# Patient Record
Sex: FEMALE | Race: WHITE | Employment: OTHER | URBAN - METROPOLITAN AREA
[De-identification: names, ages, dates, MRNs, and addresses within clinical notes are randomized per-mention and may not be internally consistent; named-entity substitution may affect disease eponyms.]

---

## 2017-03-06 ENCOUNTER — TELEPHONE (OUTPATIENT)
Dept: PAIN MANAGEMENT | Age: 52
End: 2017-03-06

## 2017-04-14 RX ORDER — GABAPENTIN 300 MG/1
CAPSULE ORAL
Qty: 270 CAPSULE | Refills: 0 | OUTPATIENT
Start: 2017-04-14

## 2017-07-02 DIAGNOSIS — G43.709 CHRONIC MIGRAINE W/O AURA W/O STATUS MIGRAINOSUS, NOT INTRACTABLE: ICD-10-CM

## 2017-07-03 RX ORDER — SUMATRIPTAN 100 MG/1
TABLET, FILM COATED ORAL
Qty: 9 TABLET | Refills: 4 | OUTPATIENT
Start: 2017-07-03

## 2017-07-06 ENCOUNTER — OFFICE VISIT (OUTPATIENT)
Dept: FAMILY MEDICINE CLINIC | Age: 52
End: 2017-07-06

## 2017-07-06 ENCOUNTER — TELEPHONE (OUTPATIENT)
Dept: FAMILY MEDICINE CLINIC | Age: 52
End: 2017-07-06

## 2017-07-06 VITALS
BODY MASS INDEX: 24.8 KG/M2 | HEIGHT: 63 IN | SYSTOLIC BLOOD PRESSURE: 144 MMHG | DIASTOLIC BLOOD PRESSURE: 82 MMHG | WEIGHT: 140 LBS

## 2017-07-06 DIAGNOSIS — Z11.59 NEED FOR HEPATITIS C SCREENING TEST: ICD-10-CM

## 2017-07-06 DIAGNOSIS — I48.3 TYPICAL ATRIAL FLUTTER (HCC): ICD-10-CM

## 2017-07-06 DIAGNOSIS — E78.00 HYPERCHOLESTEROLEMIA: ICD-10-CM

## 2017-07-06 DIAGNOSIS — K27.9 PUD (PEPTIC ULCER DISEASE): ICD-10-CM

## 2017-07-06 DIAGNOSIS — Z00.00 PREVENTATIVE HEALTH CARE: ICD-10-CM

## 2017-07-06 DIAGNOSIS — I77.1 SUBCLAVIAN ARTERY STENOSIS (HCC): ICD-10-CM

## 2017-07-06 DIAGNOSIS — G43.709 CHRONIC MIGRAINE W/O AURA W/O STATUS MIGRAINOSUS, NOT INTRACTABLE: ICD-10-CM

## 2017-07-06 DIAGNOSIS — I77.1 CELIAC ARTERY STENOSIS (HCC): ICD-10-CM

## 2017-07-06 DIAGNOSIS — I10 ESSENTIAL HYPERTENSION: Primary | ICD-10-CM

## 2017-07-06 DIAGNOSIS — M54.31 RIGHT SIDED SCIATICA: ICD-10-CM

## 2017-07-06 DIAGNOSIS — Z12.39 SCREENING FOR BREAST CANCER: ICD-10-CM

## 2017-07-06 DIAGNOSIS — I25.10 CORONARY ARTERY DISEASE INVOLVING NATIVE CORONARY ARTERY OF NATIVE HEART WITHOUT ANGINA PECTORIS: ICD-10-CM

## 2017-07-06 DIAGNOSIS — G89.29 CHRONIC MIDLINE LOW BACK PAIN WITH RIGHT-SIDED SCIATICA: ICD-10-CM

## 2017-07-06 DIAGNOSIS — I47.1 ATRIAL TACHYCARDIA, MULTIFOCAL (HCC): ICD-10-CM

## 2017-07-06 DIAGNOSIS — Z72.0 TOBACCO ABUSE: ICD-10-CM

## 2017-07-06 DIAGNOSIS — M54.41 CHRONIC MIDLINE LOW BACK PAIN WITH RIGHT-SIDED SCIATICA: ICD-10-CM

## 2017-07-06 PROCEDURE — 99214 OFFICE O/P EST MOD 30 MIN: CPT | Performed by: FAMILY MEDICINE

## 2017-07-06 PROCEDURE — 4004F PT TOBACCO SCREEN RCVD TLK: CPT | Performed by: FAMILY MEDICINE

## 2017-07-06 PROCEDURE — G8427 DOCREV CUR MEDS BY ELIG CLIN: HCPCS | Performed by: FAMILY MEDICINE

## 2017-07-06 PROCEDURE — G8598 ASA/ANTIPLAT THER USED: HCPCS | Performed by: FAMILY MEDICINE

## 2017-07-06 PROCEDURE — 3017F COLORECTAL CA SCREEN DOC REV: CPT | Performed by: FAMILY MEDICINE

## 2017-07-06 PROCEDURE — 3014F SCREEN MAMMO DOC REV: CPT | Performed by: FAMILY MEDICINE

## 2017-07-06 PROCEDURE — G8420 CALC BMI NORM PARAMETERS: HCPCS | Performed by: FAMILY MEDICINE

## 2017-07-06 RX ORDER — SUMATRIPTAN 100 MG/1
TABLET, FILM COATED ORAL
Qty: 9 TABLET | Refills: 5 | Status: SHIPPED | OUTPATIENT
Start: 2017-07-06

## 2017-07-06 RX ORDER — CLOPIDOGREL BISULFATE 75 MG/1
75 TABLET ORAL DAILY
Qty: 30 TABLET | Refills: 5 | Status: SHIPPED | OUTPATIENT
Start: 2017-07-06

## 2017-07-06 RX ORDER — ATORVASTATIN CALCIUM 80 MG/1
80 TABLET, FILM COATED ORAL DAILY
Qty: 30 TABLET | Refills: 5 | Status: SHIPPED | OUTPATIENT
Start: 2017-07-06 | End: 2017-12-25

## 2017-07-06 RX ORDER — GABAPENTIN 300 MG/1
CAPSULE ORAL
Qty: 270 CAPSULE | Refills: 1 | Status: SHIPPED | OUTPATIENT
Start: 2017-07-06 | End: 2017-12-19 | Stop reason: SDUPTHER

## 2017-07-06 RX ORDER — PANTOPRAZOLE SODIUM 40 MG/1
40 TABLET, DELAYED RELEASE ORAL DAILY
Qty: 30 TABLET | Refills: 5 | Status: SHIPPED | OUTPATIENT
Start: 2017-07-06 | End: 2017-11-03 | Stop reason: SDUPTHER

## 2017-07-06 RX ORDER — ATENOLOL 50 MG/1
50 TABLET ORAL DAILY
Qty: 30 TABLET | Refills: 5 | Status: SHIPPED | OUTPATIENT
Start: 2017-07-06 | End: 2018-01-08

## 2017-07-06 ASSESSMENT — ENCOUNTER SYMPTOMS: SHORTNESS OF BREATH: 0

## 2017-07-07 ENCOUNTER — TELEPHONE (OUTPATIENT)
Dept: FAMILY MEDICINE CLINIC | Age: 52
End: 2017-07-07

## 2017-07-10 ENCOUNTER — TELEPHONE (OUTPATIENT)
Dept: FAMILY MEDICINE CLINIC | Age: 52
End: 2017-07-10

## 2017-07-10 DIAGNOSIS — G89.29 CHRONIC MIDLINE LOW BACK PAIN WITHOUT SCIATICA: Primary | ICD-10-CM

## 2017-07-10 DIAGNOSIS — M54.50 CHRONIC MIDLINE LOW BACK PAIN WITHOUT SCIATICA: Primary | ICD-10-CM

## 2017-08-07 ENCOUNTER — TELEPHONE (OUTPATIENT)
Dept: FAMILY MEDICINE CLINIC | Age: 52
End: 2017-08-07

## 2017-11-03 RX ORDER — PANTOPRAZOLE SODIUM 40 MG/1
40 TABLET, DELAYED RELEASE ORAL DAILY
Qty: 30 TABLET | Refills: 1 | Status: SHIPPED | OUTPATIENT
Start: 2017-11-03 | End: 2018-04-19 | Stop reason: SDUPTHER

## 2017-11-30 ENCOUNTER — CARE COORDINATION (OUTPATIENT)
Dept: MEDSURG UNIT | Age: 52
End: 2017-11-30

## 2017-11-30 NOTE — CARE COORDINATION
[facility] Transitions Initial Follow Up Call    Call within 2 business days of discharge: Yes    Patient: Ana Lilia Dominguez Patient : 1965   MRN: <[id number]>  Reason for Admission: There are no discharge diagnoses documented for the most recent discharge. Discharge Date: 16 RARS: [place] No Data Recorded         Facility: [place] [address] Transitions 24 Hour Call    Care Transitions Interventions     Attempted to contact Pt for initial transitions call. Contact information left to  requesting call back at the earliest convenience. Clark Roa RN BSN   Care Transitions Coordinator  [phone]       Follow Up  No future appointments.     Clark Roa RN

## 2017-12-04 NOTE — CARE COORDINATION
[facility] Transitions Follow Up Call    2017    Patient: Meeta Saldivar  Patient : 1965   MRN: <[id number]>  Reason for Admission: There are no discharge diagnoses documented for the most recent discharge. Discharge Date: 16 RARS: No Data Recorded       Third attempt to contact patient for inital transition call. Unable to reach, numbers listed in chart are invalid, mobile number does not accept incoming calls. No further attempts to contact for CTC will be made. Care Transitions Subsequent and Final Call    Subsequent and Final Calls  Care Transitions Interventions  Other Interventions: Follow Up  No future appointments.     Mandy Rod RN

## 2017-12-25 PROBLEM — R07.9 CHEST PAIN: Status: ACTIVE | Noted: 2017-12-25

## 2017-12-29 PROBLEM — E05.90 HYPERTHYROIDISM: Status: ACTIVE | Noted: 2017-12-29

## 2017-12-30 ENCOUNTER — CARE COORDINATION (OUTPATIENT)
Dept: CASE MANAGEMENT | Age: 52
End: 2017-12-30

## 2017-12-30 NOTE — CARE COORDINATION
[facility] Transitions Initial Follow Up Call    Call within 2 business days of discharge: Yes    Patient: Hiren Farooq Patient : 1965   MRN: <[id number]>  Reason for Admission: hyperthyroid, abd pain  Discharge Date: 17 RARS: [facility] Risk Score: 11.5    Facility: [facility]    Unable to reach patient for initial 24h care transition outreach. First number is 's mobile - message left. Other numbers are invalid or not accepting incoming calls. Outreach attempts will continue if not return call is made. Follow Up  No future appointments.     Jazmyne Melchor RN

## 2018-01-02 NOTE — CARE COORDINATION
[facility] Transitions Initial Follow Up Call    Call within 2 business days of discharge: Yes    Patient: Jaz Hernandez Patient : 1965   MRN: <[id number]>  Reason for Admission: There are no discharge diagnoses documented for the most recent discharge. Discharge Date: 17 RARS: [facility] Risk Score: 11.5     3rd and final attempt at an initial 24 hour call, contact info left on vm    Follow Up  No future appointments.     Madhuri Boone RN

## 2018-01-08 RX ORDER — METHIMAZOLE 10 MG/1
20 TABLET ORAL DAILY
COMMUNITY
End: 2018-01-18

## 2018-01-08 RX ORDER — METOPROLOL SUCCINATE 50 MG/1
50 TABLET, EXTENDED RELEASE ORAL DAILY
Status: ON HOLD | COMMUNITY
End: 2020-08-19 | Stop reason: SINTOL

## 2018-01-18 RX ORDER — GABAPENTIN 600 MG/1
TABLET ORAL
COMMUNITY
Start: 2018-01-08

## 2018-01-18 RX ORDER — METHIMAZOLE 10 MG/1
20 TABLET ORAL
COMMUNITY
Start: 2018-01-17 | End: 2018-02-16

## 2018-01-18 RX ORDER — PROPRANOLOL HYDROCHLORIDE 60 MG/1
60 TABLET ORAL
COMMUNITY
Start: 2018-01-17 | End: 2018-02-16

## 2018-01-23 PROBLEM — B37.81 CANDIDA ESOPHAGITIS (HCC): Status: ACTIVE | Noted: 2018-01-22

## 2018-01-23 PROBLEM — K44.9 HIATAL HERNIA: Status: ACTIVE | Noted: 2018-01-22

## 2018-04-20 RX ORDER — PANTOPRAZOLE SODIUM 40 MG/1
40 TABLET, DELAYED RELEASE ORAL DAILY
Qty: 30 TABLET | Refills: 1 | Status: ON HOLD | OUTPATIENT
Start: 2018-04-20 | End: 2020-08-19 | Stop reason: HOSPADM

## 2020-08-14 ENCOUNTER — APPOINTMENT (OUTPATIENT)
Dept: GENERAL RADIOLOGY | Age: 55
DRG: 315 | End: 2020-08-14
Payer: MEDICARE

## 2020-08-14 ENCOUNTER — HOSPITAL ENCOUNTER (INPATIENT)
Age: 55
LOS: 1 days | Discharge: LEFT BEFORE DISCHARGE | DRG: 315 | End: 2020-08-14
Attending: STUDENT IN AN ORGANIZED HEALTH CARE EDUCATION/TRAINING PROGRAM | Admitting: INTERNAL MEDICINE
Payer: MEDICARE

## 2020-08-14 ENCOUNTER — APPOINTMENT (OUTPATIENT)
Dept: CT IMAGING | Age: 55
DRG: 315 | End: 2020-08-14
Payer: MEDICARE

## 2020-08-14 VITALS
OXYGEN SATURATION: 100 % | SYSTOLIC BLOOD PRESSURE: 141 MMHG | WEIGHT: 112 LBS | BODY MASS INDEX: 19.84 KG/M2 | HEIGHT: 63 IN | HEART RATE: 74 BPM | DIASTOLIC BLOOD PRESSURE: 92 MMHG | RESPIRATION RATE: 15 BRPM | TEMPERATURE: 97.5 F

## 2020-08-14 PROBLEM — I31.39 PERICARDIAL EFFUSION: Status: ACTIVE | Noted: 2020-08-14

## 2020-08-14 LAB
A/G RATIO: 1.4 (ref 1.1–2.2)
ALBUMIN SERPL-MCNC: 5 G/DL (ref 3.4–5)
ALP BLD-CCNC: 61 U/L (ref 40–129)
ALT SERPL-CCNC: 6 U/L (ref 10–40)
ANION GAP SERPL CALCULATED.3IONS-SCNC: 12 MMOL/L (ref 3–16)
AST SERPL-CCNC: 31 U/L (ref 15–37)
BASOPHILS ABSOLUTE: 0.1 K/UL (ref 0–0.2)
BASOPHILS RELATIVE PERCENT: 0.8 %
BILIRUB SERPL-MCNC: 0.4 MG/DL (ref 0–1)
BUN BLDV-MCNC: 13 MG/DL (ref 7–20)
CALCIUM SERPL-MCNC: 10.1 MG/DL (ref 8.3–10.6)
CHLORIDE BLD-SCNC: 96 MMOL/L (ref 99–110)
CO2: 23 MMOL/L (ref 21–32)
CREAT SERPL-MCNC: 0.8 MG/DL (ref 0.6–1.1)
EOSINOPHILS ABSOLUTE: 0.1 K/UL (ref 0–0.6)
EOSINOPHILS RELATIVE PERCENT: 0.9 %
GFR AFRICAN AMERICAN: >60
GFR NON-AFRICAN AMERICAN: >60
GLOBULIN: 3.7 G/DL
GLUCOSE BLD-MCNC: 93 MG/DL (ref 70–99)
HCT VFR BLD CALC: 37.5 % (ref 36–48)
HEMOGLOBIN: 12.4 G/DL (ref 12–16)
LIPASE: 15 U/L (ref 13–60)
LYMPHOCYTES ABSOLUTE: 1.4 K/UL (ref 1–5.1)
LYMPHOCYTES RELATIVE PERCENT: 21.4 %
MCH RBC QN AUTO: 33.5 PG (ref 26–34)
MCHC RBC AUTO-ENTMCNC: 33.1 G/DL (ref 31–36)
MCV RBC AUTO: 101 FL (ref 80–100)
MONOCYTES ABSOLUTE: 0.2 K/UL (ref 0–1.3)
MONOCYTES RELATIVE PERCENT: 2.9 %
NEUTROPHILS ABSOLUTE: 4.8 K/UL (ref 1.7–7.7)
NEUTROPHILS RELATIVE PERCENT: 74 %
PDW BLD-RTO: 16.2 % (ref 12.4–15.4)
PLATELET # BLD: 283 K/UL (ref 135–450)
PMV BLD AUTO: 9.7 FL (ref 5–10.5)
POTASSIUM SERPL-SCNC: 4.7 MMOL/L (ref 3.5–5.1)
RBC # BLD: 3.71 M/UL (ref 4–5.2)
SODIUM BLD-SCNC: 131 MMOL/L (ref 136–145)
TOTAL PROTEIN: 8.7 G/DL (ref 6.4–8.2)
TROPONIN: <0.01 NG/ML
WBC # BLD: 6.4 K/UL (ref 4–11)

## 2020-08-14 PROCEDURE — 74176 CT ABD & PELVIS W/O CONTRAST: CPT

## 2020-08-14 PROCEDURE — 71045 X-RAY EXAM CHEST 1 VIEW: CPT

## 2020-08-14 PROCEDURE — 6360000002 HC RX W HCPCS: Performed by: PHYSICIAN ASSISTANT

## 2020-08-14 PROCEDURE — 99285 EMERGENCY DEPT VISIT HI MDM: CPT

## 2020-08-14 PROCEDURE — 96374 THER/PROPH/DIAG INJ IV PUSH: CPT

## 2020-08-14 PROCEDURE — 96375 TX/PRO/DX INJ NEW DRUG ADDON: CPT

## 2020-08-14 PROCEDURE — 84484 ASSAY OF TROPONIN QUANT: CPT

## 2020-08-14 PROCEDURE — 6360000004 HC RX CONTRAST MEDICATION: Performed by: STUDENT IN AN ORGANIZED HEALTH CARE EDUCATION/TRAINING PROGRAM

## 2020-08-14 PROCEDURE — 2060000000 HC ICU INTERMEDIATE R&B

## 2020-08-14 PROCEDURE — 72131 CT LUMBAR SPINE W/O DYE: CPT

## 2020-08-14 PROCEDURE — 72128 CT CHEST SPINE W/O DYE: CPT

## 2020-08-14 PROCEDURE — 96372 THER/PROPH/DIAG INJ SC/IM: CPT

## 2020-08-14 PROCEDURE — 74174 CTA ABD&PLVS W/CONTRAST: CPT

## 2020-08-14 PROCEDURE — 6360000002 HC RX W HCPCS

## 2020-08-14 PROCEDURE — 83690 ASSAY OF LIPASE: CPT

## 2020-08-14 PROCEDURE — 84443 ASSAY THYROID STIM HORMONE: CPT

## 2020-08-14 PROCEDURE — 72125 CT NECK SPINE W/O DYE: CPT

## 2020-08-14 PROCEDURE — 2580000003 HC RX 258: Performed by: PHYSICIAN ASSISTANT

## 2020-08-14 PROCEDURE — 70450 CT HEAD/BRAIN W/O DYE: CPT

## 2020-08-14 PROCEDURE — 80053 COMPREHEN METABOLIC PANEL: CPT

## 2020-08-14 PROCEDURE — 93005 ELECTROCARDIOGRAM TRACING: CPT | Performed by: STUDENT IN AN ORGANIZED HEALTH CARE EDUCATION/TRAINING PROGRAM

## 2020-08-14 PROCEDURE — 84439 ASSAY OF FREE THYROXINE: CPT

## 2020-08-14 PROCEDURE — 85025 COMPLETE CBC W/AUTO DIFF WBC: CPT

## 2020-08-14 PROCEDURE — 6370000000 HC RX 637 (ALT 250 FOR IP): Performed by: PHYSICIAN ASSISTANT

## 2020-08-14 RX ORDER — KETOROLAC TROMETHAMINE 30 MG/ML
INJECTION, SOLUTION INTRAMUSCULAR; INTRAVENOUS
Status: COMPLETED
Start: 2020-08-14 | End: 2020-08-14

## 2020-08-14 RX ORDER — METHYLPREDNISOLONE SODIUM SUCCINATE 125 MG/2ML
125 INJECTION, POWDER, LYOPHILIZED, FOR SOLUTION INTRAMUSCULAR; INTRAVENOUS ONCE
Status: COMPLETED | OUTPATIENT
Start: 2020-08-14 | End: 2020-08-14

## 2020-08-14 RX ORDER — CLOPIDOGREL BISULFATE 75 MG/1
75 TABLET ORAL DAILY
Status: DISCONTINUED | OUTPATIENT
Start: 2020-08-15 | End: 2020-08-15 | Stop reason: HOSPADM

## 2020-08-14 RX ORDER — 0.9 % SODIUM CHLORIDE 0.9 %
1000 INTRAVENOUS SOLUTION INTRAVENOUS ONCE
Status: COMPLETED | OUTPATIENT
Start: 2020-08-14 | End: 2020-08-14

## 2020-08-14 RX ORDER — PROMETHAZINE HYDROCHLORIDE 25 MG/ML
25 INJECTION, SOLUTION INTRAMUSCULAR; INTRAVENOUS ONCE
Status: COMPLETED | OUTPATIENT
Start: 2020-08-14 | End: 2020-08-14

## 2020-08-14 RX ORDER — GABAPENTIN 400 MG/1
800 CAPSULE ORAL 3 TIMES DAILY
Status: DISCONTINUED | OUTPATIENT
Start: 2020-08-14 | End: 2020-08-15 | Stop reason: HOSPADM

## 2020-08-14 RX ORDER — SODIUM CHLORIDE 0.9 % (FLUSH) 0.9 %
10 SYRINGE (ML) INJECTION PRN
Status: DISCONTINUED | OUTPATIENT
Start: 2020-08-14 | End: 2020-08-15 | Stop reason: HOSPADM

## 2020-08-14 RX ORDER — METHYLPREDNISOLONE SODIUM SUCCINATE 125 MG/2ML
125 INJECTION, POWDER, LYOPHILIZED, FOR SOLUTION INTRAMUSCULAR; INTRAVENOUS ONCE
Status: DISCONTINUED | OUTPATIENT
Start: 2020-08-14 | End: 2020-08-14

## 2020-08-14 RX ORDER — ONDANSETRON 2 MG/ML
4 INJECTION INTRAMUSCULAR; INTRAVENOUS EVERY 6 HOURS PRN
Status: DISCONTINUED | OUTPATIENT
Start: 2020-08-14 | End: 2020-08-15 | Stop reason: HOSPADM

## 2020-08-14 RX ORDER — METOPROLOL SUCCINATE 50 MG/1
50 TABLET, EXTENDED RELEASE ORAL DAILY
Status: DISCONTINUED | OUTPATIENT
Start: 2020-08-15 | End: 2020-08-15 | Stop reason: HOSPADM

## 2020-08-14 RX ORDER — ACETAMINOPHEN 325 MG/1
650 TABLET ORAL EVERY 6 HOURS PRN
Status: DISCONTINUED | OUTPATIENT
Start: 2020-08-14 | End: 2020-08-15 | Stop reason: HOSPADM

## 2020-08-14 RX ORDER — DIPHENHYDRAMINE HYDROCHLORIDE 50 MG/ML
50 INJECTION INTRAMUSCULAR; INTRAVENOUS ONCE
Status: DISCONTINUED | OUTPATIENT
Start: 2020-08-14 | End: 2020-08-15 | Stop reason: HOSPADM

## 2020-08-14 RX ORDER — MORPHINE SULFATE 4 MG/ML
4 INJECTION, SOLUTION INTRAMUSCULAR; INTRAVENOUS
Status: DISCONTINUED | OUTPATIENT
Start: 2020-08-14 | End: 2020-08-15 | Stop reason: HOSPADM

## 2020-08-14 RX ORDER — MORPHINE SULFATE 4 MG/ML
4 INJECTION, SOLUTION INTRAMUSCULAR; INTRAVENOUS ONCE
Status: COMPLETED | OUTPATIENT
Start: 2020-08-14 | End: 2020-08-14

## 2020-08-14 RX ORDER — KETOROLAC TROMETHAMINE 30 MG/ML
30 INJECTION, SOLUTION INTRAMUSCULAR; INTRAVENOUS EVERY 6 HOURS PRN
Status: DISCONTINUED | OUTPATIENT
Start: 2020-08-14 | End: 2020-08-15 | Stop reason: HOSPADM

## 2020-08-14 RX ORDER — PANTOPRAZOLE SODIUM 40 MG/1
1 TABLET, DELAYED RELEASE ORAL DAILY
Status: CANCELLED | OUTPATIENT
Start: 2020-08-14

## 2020-08-14 RX ORDER — LORAZEPAM 2 MG/ML
1 INJECTION INTRAMUSCULAR ONCE
Status: COMPLETED | OUTPATIENT
Start: 2020-08-14 | End: 2020-08-14

## 2020-08-14 RX ORDER — PANTOPRAZOLE SODIUM 40 MG/10ML
40 INJECTION, POWDER, LYOPHILIZED, FOR SOLUTION INTRAVENOUS 2 TIMES DAILY
Status: DISCONTINUED | OUTPATIENT
Start: 2020-08-14 | End: 2020-08-15 | Stop reason: HOSPADM

## 2020-08-14 RX ORDER — ONDANSETRON 2 MG/ML
4 INJECTION INTRAMUSCULAR; INTRAVENOUS ONCE
Status: COMPLETED | OUTPATIENT
Start: 2020-08-14 | End: 2020-08-14

## 2020-08-14 RX ORDER — SODIUM CHLORIDE 0.9 % (FLUSH) 0.9 %
10 SYRINGE (ML) INJECTION EVERY 12 HOURS SCHEDULED
Status: DISCONTINUED | OUTPATIENT
Start: 2020-08-14 | End: 2020-08-15 | Stop reason: HOSPADM

## 2020-08-14 RX ADMIN — METHYLPREDNISOLONE SODIUM SUCCINATE 125 MG: 125 INJECTION, POWDER, FOR SOLUTION INTRAMUSCULAR; INTRAVENOUS at 17:27

## 2020-08-14 RX ADMIN — ONDANSETRON 4 MG: 2 INJECTION INTRAMUSCULAR; INTRAVENOUS at 17:27

## 2020-08-14 RX ADMIN — ACETAMINOPHEN 650 MG: 325 TABLET, FILM COATED ORAL at 21:58

## 2020-08-14 RX ADMIN — LORAZEPAM 1 MG: 2 INJECTION, SOLUTION INTRAMUSCULAR; INTRAVENOUS at 22:33

## 2020-08-14 RX ADMIN — SODIUM CHLORIDE 1000 ML: 9 INJECTION, SOLUTION INTRAVENOUS at 17:27

## 2020-08-14 RX ADMIN — PROMETHAZINE HYDROCHLORIDE 25 MG: 25 INJECTION INTRAMUSCULAR; INTRAVENOUS at 19:45

## 2020-08-14 RX ADMIN — IOPAMIDOL 75 ML: 755 INJECTION, SOLUTION INTRAVENOUS at 22:34

## 2020-08-14 RX ADMIN — MORPHINE SULFATE 4 MG: 4 INJECTION INTRAVENOUS at 17:28

## 2020-08-14 RX ADMIN — KETOROLAC TROMETHAMINE 30 MG: 30 INJECTION, SOLUTION INTRAMUSCULAR; INTRAVENOUS at 21:35

## 2020-08-14 RX ADMIN — KETOROLAC TROMETHAMINE 30 MG: 30 INJECTION, SOLUTION INTRAMUSCULAR at 21:35

## 2020-08-14 ASSESSMENT — ENCOUNTER SYMPTOMS
BACK PAIN: 0
BLOOD IN STOOL: 0
VOMITING: 1
RECTAL PAIN: 0
STRIDOR: 0
SHORTNESS OF BREATH: 0
NAUSEA: 1
ANAL BLEEDING: 0
COUGH: 0
ABDOMINAL PAIN: 1
WHEEZING: 0
DIARRHEA: 0
ABDOMINAL DISTENTION: 0
COLOR CHANGE: 0
CONSTIPATION: 0

## 2020-08-14 ASSESSMENT — PAIN SCALES - GENERAL
PAINLEVEL_OUTOF10: 10
PAINLEVEL_OUTOF10: 9
PAINLEVEL_OUTOF10: 10
PAINLEVEL_OUTOF10: 10

## 2020-08-14 ASSESSMENT — PAIN DESCRIPTION - PAIN TYPE: TYPE: ACUTE PAIN

## 2020-08-14 ASSESSMENT — PAIN DESCRIPTION - LOCATION: LOCATION: BACK

## 2020-08-14 ASSESSMENT — PAIN DESCRIPTION - ORIENTATION: ORIENTATION: LOWER

## 2020-08-14 NOTE — ED NOTES
Pt called out to use bathroom dt she said she had diarrhea, rn gave pt urine cup to try to give sample for order, pt stated she could not provide specimen and she only had gas, pt has pain, numbness and no changes, pt walked to bathroom x1 assist michael over      Alfred Bryan, FLORIAN  08/14/20 9227

## 2020-08-14 NOTE — ED NOTES
RN had call from  regarding pts status, rn went into room to notify pt, pt has cell phone, rn asked if it was ok for  to call her for updates, pt agreed, rn told  to call wife to update  agreed and rn thanks       Frankie Painting RN  08/14/20 1436

## 2020-08-14 NOTE — ED NOTES
md in room updating pt, pt told provider she is mad and upset she has a bed alarm, pt said she tripped at home over cord, rn explained it is a policy and for safety, provider explained to patient as well it is for her safety so she does not fall in ER or hospital, rn walked pt to bathroom when sitting pt became unbalance, pt said she is weak she was not falling, rn explained this is why bed alarm is on and a team member is w her.  Pt upset, reports she is in pain and anxious about having to stay, pt called  on phone,      Frankie Painting RN  08/14/20 1954

## 2020-08-14 NOTE — ED NOTES
rn notified pt rn continues to need a urine per order, pt states she is unable to, rn notified pt she may be straight cath for specimen     Tianna Echevarria RN  08/14/20 1955

## 2020-08-14 NOTE — ED NOTES
Pt reports pain 8-8.5/10 after medication given per mar, pt called out to use bathroom, rn delegated to aarti tech to take pt to bathroom     Giulia Marshall RN  08/14/20 Stefano Zamora

## 2020-08-14 NOTE — ED NOTES
Pt called out and in pain sitting up taking breath away she said 10/10 pain,      Chastity Gomez RN  08/14/20 1934

## 2020-08-14 NOTE — H&P
Hospital Medicine History & Physical      Patient Name: Pattie Love    : 1965    PCP: Ana Nolan DO    Date of Service:  Patient seen and examined on 2020     Chief Complaint:  Back pain s/p fall     History Of Present Illness:    Pattie Love is a 47 y.o. female who presented to ED with complaint of multiple complaints. She reports generalized abdominal discomfort, nausea and vomiting began early this morning. She was going back and forth to the restroom and tripped over her fan cord around 4 AM.  She fell directly on her buttocks on the tile floor in the bathroom. She is unsure if she hit her head. She denies any loss of consciousness. She does have chronic low back pain but states that pain has increased since the fall and she has also developed pain in her neck. She reports a sensation of numbness/tingling to both of her hands and right second toe. She is able to bear weight and ambulate but does have increased pain with ambulation. She takes gabapentin for chronic pain. She denies any headache, dizziness, changes in vision, difficulty swallowing, focal weakness. She denies fever, chest pain, shortness of breath, cough, edema, diarrhea, constipation, pain with urination. Of note, patient does have a history of PUD and celiac artery stenosis. Her last CTA abdomen/pelvis was in 2016. Last EGD and colonoscopy 2018. In addition patient has a history of hypothyroidism s/p thyroid radiation ablation in . Patient is noncompliant with her thyroid medication. TSH has been markedly elevated (70s-80s) for ~2 years with last TSH 81.3 on 19. Work-up initiated in ED. CXR negative for acute process. CT head negative for acute intracranial abnormality. CT cervical, thoracic, lumbar spine negative for acute abnormality.   CT abdomen/pelvis revealed moderate pericardial effusion and trace right pleural effusion which are new findings compared to CT 2/4/2018 but is felt unlikely to be due to trauma. Mild hyponatremia noted. No other significant electrolyte abnormalities or evidence of kidney failure. No leukocytosis or anemia. EKG shows NSR without evidence of acute ischemia or infarction. Troponin negative. Lipase WNL. ED physician reports he spoke with cardiology, Dr. Harlen Oppenheim who recommends ECHO for further evaluation of pericardial effusion. Past Medical History:    Patient  has a past medical history of Candida esophagitis ([address] Celiac artery stenosis ([address] Childhood asthma, Chronic midline low back pain without sciatica, Chronic migraine w/o aura w/o status migrainosus, not intractable, Coronary artery disease involving native coronary artery of native heart without angina pectoris, Duodenal stenosis, Essential hypertension, Hiatal hernia, Hypercholesterolemia, Hyperthyroidism, Movement disorder, PUD (peptic ulcer disease), Subclavian artery stenosis ([address] and Tobacco abuse. Past Surgical History:    Patient  has a past surgical history that includes lumbar laminectomy (2004 / 2008); Hysterectomy (1994); Ovarian cyst surgery (1992); laparoscopy (1012); laparotomy (4354); [place] tooth extraction; back surgery; Colonoscopy; Cardiac surgery; Upper gastrointestinal endoscopy (6/1/15); Upper gastrointestinal endoscopy (7/28/15); Upper gastrointestinal endoscopy (4/25/16); and vascular surgery (01/2015). Medications Prior to Admission:      Prior to Admission medications    Medication Sig Start Date End Date Taking? Authorizing Provider   pantoprazole (PROTONIX) 40 MG tablet TAKE 1 TABLET BY MOUTH DAILY 4/20/18  Yes Martin Sawyer,    ondansetron (ZOFRAN ODT) 4 MG disintegrating tablet Take 1-2 tablets by mouth every 8 hours as needed for Nausea May substitute the non ODT tablets if not covered financially by the insurance plan.  2/4/18  Yes Marcello Byrd MD   gabapentin (NEURONTIN) 600 MG tablet  1/8/18  Yes Historical Provider, MD   metoprolol succinate (TOPROL XL) 50 MG extended release tablet Take 50 mg by mouth daily   Yes Historical Provider, MD   promethazine (PHENERGAN) 25 MG tablet Take 25 mg by mouth every 6 hours as needed for Nausea (Nausea due to migraines)   Yes Historical Provider, MD   clopidogrel (PLAVIX) 75 MG tablet Take 1 tablet by mouth daily 7/6/17  Yes Juan Carlos Blue DO   SUMAtriptan (IMITREX) 100 MG tablet TAKE ONE BY MOUTH AT ONSET OF MIGRAINE. MAY REPEAT ONCE IN 2 HRS. MAX OF 2 PER DAY. 7/6/17  Yes Noreen Collado DO       Allergies:  Iodine and Penicillins    Social History:      TOBACCO:   reports that she has been smoking cigarettes. She has a 5.00 pack-year smoking history. She has never used smokeless tobacco.  ETOH:   reports no history of alcohol use. DRUGS:  reports no history of drug use. Family History:      Reviewed in detail positive as follows:        Problem Relation Age of Onset    Cancer Mother         Breast     High Blood Pressure Father     Heart Disease Father         MI    Diabetes Paternal Aunt     Asthma Son     Asthma Daughter        REVIEW OF SYSTEMS:   Pertinent positives as noted in the HPI. All other systems reviewed and negative. PHYSICAL EXAM PERFORMED:    BP (!) 141/92   Pulse 74   Temp 97.5 °F (36.4 °C) (Oral)   Resp 15   Ht 5' 3\" (1.6 m)   Wt 112 lb (50.8 kg)   SpO2 100%   BMI 19.84 kg/m²     General appearance:  Awake, alert, no apparent distress  HEENT:  Normocephalic, atraumatic without obvious deformity. PERRL. EOM intact. Conjunctivae/corneas clear. Neck: Supple, with full range of motion. No JVD. Trachea midline. Respiratory:  Clear to auscultation bilaterally without rales, wheezes, or rhonchi. Normal respiratory effort. Cardiovascular:  Regular rate and rhythm without murmurs, rubs or gallops. Abdomen: Diffuse tenderness to palpation. Soft, ND, without rebound or guarding. Normal bowel sounds.   Back: Cervical and lumbar pericardial effusion and trace amount of right   pleural fluid. These are new findings from the patient's abdomen/pelvis CT   February 4, 2018. Is doubtful that this is due to trauma but of uncertain   etiology. No noncontrast CT evidence of acute or traumatic process of the   abdomen pelvis. Thoracic and lumbar spine: No acute or traumatic abnormality of the thoracic   spine. No fracture         CT THORACIC SPINE WO CONTRAST   Final Result   Abdomen/pelvis: Moderate pericardial effusion and trace amount of right   pleural fluid. These are new findings from the patient's abdomen/pelvis CT   February 4, 2018. Is doubtful that this is due to trauma but of uncertain   etiology. No noncontrast CT evidence of acute or traumatic process of the   abdomen pelvis. Thoracic and lumbar spine: No acute or traumatic abnormality of the thoracic   spine. No fracture         CT CERVICAL SPINE WO CONTRAST   Final Result   Head: No acute intracranial abnormality. There is an air-fluid level of the   right maxillary sinus most likely due to sinusitis. Cervical spine: No acute abnormality of the cervical spine. CT HEAD WO CONTRAST   Final Result   Head: No acute intracranial abnormality. There is an air-fluid level of the   right maxillary sinus most likely due to sinusitis. Cervical spine: No acute abnormality of the cervical spine. XR CHEST PORTABLE   Preliminary Result   No acute cardiopulmonary process. EKG:   Read by ED physician in the absence of a cardiologist:  \"normal sinus rhythm with a rate of 80  Axis is   Normal  QTc is  normal  Intervals and Durations are unremarkable. ST Segments: no acute change  No significant change from prior EKG dated 2/4/18\"      ASSESSMENT/PLAN:    Pericardial effusion  CT abdomen/pelvis did reveal moderate pericardial effusion  Patient denies chest pain and SOB. Suspect complication of longstanding untreated hypothyroidism.   TSH ordered  ECHO ordered  Cardiology consulted     Hypothyroidism  S/p thyroid radiation ablation in 2018  Has not taken levothyroxine or followed up with endocrinology for several years. Mild hyponatremia and moderate pericardial effusion noted, but no overt findings concerning for myxedema coma noted at present. However, last TSH on 9/19/19 was 81.3.  TSH ordered    Abdominal pain with vomiting  History of PUD and celiac artery stenosis  Last EGD/colonoscopy 1/2018. Last CTA abdomen/pelvis 4/2016. Protonix BID  CTA abdomen/pelvis w/contrast ordered    Acute exacerbation of chronic low back pain  S/p fall   CT cervical, thoracic, lumbar spine negative for acute process  Continue home gabapentin  Solumedrol 125 mg IV x 1 administered in ED  Morphine and toradol PRN    Hyponatremia  Mild and asymptomatic  Received 1L IV NS in ED. Will repeat BMP in AM    CAD s/p PCI  Continue home plavix, BB      DVT prophylaxis: Lovenox  GI prophylaxis: Protonix  Probiotic if on abx: N/A    Diet: DIET GENERAL;  Code Status: Full Code    Consults:  IP CONSULT TO CARDIOLOGY  IP CONSULT TO HOSPITALIST    Disposition: Admit to Inpatient   ELOS: Greater than two midnights due to medical therapy     Danisha Thomas PA-C    Thank you Lianne Gifford DO for the opportunity to be involved in this patient's care. If you have any questions or concerns please feel free to contact me at [phone].

## 2020-08-14 NOTE — ED PROVIDER NOTES
0.0 - 0.6 K/uL    Basophils Absolute 0.1 0.0 - 0.2 K/uL   Comprehensive Metabolic Panel   Result Value Ref Range    Sodium 131 (L) 136 - 145 mmol/L    Potassium 4.7 3.5 - 5.1 mmol/L    Chloride 96 (L) 99 - 110 mmol/L    CO2 23 21 - 32 mmol/L    Anion Gap 12 3 - 16    Glucose 93 70 - 99 mg/dL    BUN 13 7 - 20 mg/dL    CREATININE 0.8 0.6 - 1.1 mg/dL    GFR Non-African American >60 >60    GFR African American >60 >60    Calcium 10.1 8.3 - 10.6 mg/dL    Total Protein 8.7 (H) 6.4 - 8.2 g/dL    Alb 5.0 3.4 - 5.0 g/dL    Albumin/Globulin Ratio 1.4 1.1 - 2.2    Total Bilirubin 0.4 0.0 - 1.0 mg/dL    Alkaline Phosphatase 61 40 - 129 U/L    ALT 6 (L) 10 - 40 U/L    AST 31 15 - 37 U/L    Globulin 3.7 g/dL   Lipase   Result Value Ref Range    Lipase 15.0 13.0 - 60.0 U/L   Troponin   Result Value Ref Range    Troponin <0.01 <0.01 ng/mL   TSH with Reflex   Result Value Ref Range    TSH 44.49 (H) 0.27 - 4.20 uIU/mL   T4, Free   Result Value Ref Range    T4 Free 0.2 (L) 0.9 - 1.8 ng/dL   EKG 12 Lead   Result Value Ref Range    Ventricular Rate 80 BPM    Atrial Rate 80 BPM    P-R Interval 150 ms    QRS Duration 82 ms    Q-T Interval 376 ms    QTc Calculation (Bazett) 433 ms    P Axis 37 degrees    R Axis 49 degrees    T Axis 67 degrees    Diagnosis       Normal sinus rhythmNormal ECGConfirmed by MAEVE ELI MD ([id number]) on 8/15/2020 10:09:50 AM     Ct Abdomen Pelvis Wo Contrast Additional Contrast? None    Result Date: 8/14/2020  EXAMINATION: CT OF THE ABDOMEN AND PELVIS WITHOUT CONTRAST; CT OF THE LUMBAR SPINE WITHOUT CONTRAST; CT OF THE THORACIC SPINE WITHOUT CONTRAST 8/14/2020 6:29 pm TECHNIQUE: CT of the abdomen and pelvis was performed without the administration of intravenous contrast. Multiplanar reformatted images are provided for review.  Dose modulation, iterative reconstruction, and/or weight based adjustment of the mA/kV was utilized to reduce the radiation dose to as low as reasonably achievable.; CT of the lumbar spine was performed without the administration of intravenous contrast. Multiplanar reformatted images are provided for review. Dose modulation, iterative reconstruction, and/or weight based adjustment of the mA/kV was utilized to reduce the radiation dose to as low as reasonably achievable.; CT of the thoracic spine was performed without the administration of intravenous contrast. Multiplanar reformatted images are provided for review. Dose modulation, iterative reconstruction, and/or weight based adjustment of the mA/kV was utilized to reduce the radiation dose to as low as reasonably achievable. COMPARISON: None. HISTORY: ORDERING SYSTEM PROVIDED HISTORY: abd pain TECHNOLOGIST PROVIDED HISTORY: Reason for exam:->abd pain Additional Contrast?->None Is the patient pregnant?->No Reason for Exam: Fall (Pt. comes in by [facility] for complaints of a fall. Pt. reports that she tripped over a wire this morning and fell injuring her back. Pt. has chronic back issues. Pt. reports that ever since she has been anxious and unable to eat and having some abdominal pain; ORDERING SYSTEM PROVIDED HISTORY: fall TECHNOLOGIST PROVIDED HISTORY: Reason for exam:->fall Is the patient pregnant?->No Reason for Exam: Fall (Pt. comes in by [facility] for complaints of a fall. Pt. reports that she tripped over a wire this morning and fell injuring her back. Pt. has chronic back issues. Pt. reports that ever since she has been anxious and unable to eat and having some abdominal pain Abdomen/Pelvis: There is a trace amount of dependent right pleural fluid. A moderate-sized pericardial effusion is present measuring up to 10 mm thickness. No evidence of acute or traumatic process of the visualized lower chest otherwise. Organs: Limited due to lack of contrast.  No acute or traumatic findings the organs throughout the abdomen. GI/Bowel: No acute or traumatic findings involving bowel throughout the abdomen and pelvis.  Pelvis: Normal appearance of the bladder. No acute or traumatic findings of the pelvic structures. Peritoneum/Retroperitoneum: No free air. No free fluid or hemoperitoneum. No evidence of acute or traumatic abnormality of the aorta. Bones/Soft Tissues: No fracture throughout the abdomen and pelvis. No hematoma of the body wall. Thoracic and lumbar spine: No fracture of the thoracic spine or the lumbar spine. Normal alignment. No significant degenerative changes in the thoracic spine. There is a large amount of degenerative disc disease change at L2-3 in the lumbar spine. Fusion has been performed of L4 and L5. Moderate degenerative changes at L5-S1. Advanced degenerative changes of the facet joints in the upper lumbar spine as well. Associated central canal spinal stenosis at L2-3 stenosis appears present at other levels obscured by artifact from the fusion hardware     Abdomen/pelvis: Moderate pericardial effusion and trace amount of right pleural fluid. These are new findings from the patient's abdomen/pelvis CT February 4, 2018. Is doubtful that this is due to trauma but of uncertain etiology. No noncontrast CT evidence of acute or traumatic process of the abdomen pelvis. Thoracic and lumbar spine: No acute or traumatic abnormality of the thoracic spine. No fracture     Ct Head Wo Contrast    Result Date: 8/14/2020  EXAMINATION: CT OF THE HEAD WITHOUT CONTRAST; CT OF THE CERVICAL SPINE WITHOUT CONTRAST 8/14/2020 6:29 pm TECHNIQUE: CT of the head was performed without the administration of intravenous contrast. Dose modulation, iterative reconstruction, and/or weight based adjustment of the mA/kV was utilized to reduce the radiation dose to as low as reasonably achievable.; CT of the cervical spine was performed without the administration of intravenous contrast. Multiplanar reformatted images are provided for review.  Dose modulation, iterative reconstruction, and/or weight based adjustment of the mA/kV was utilized to reduce the radiation dose to as low as reasonably achievable. COMPARISON: Head CT February 4, 2018. Cervical spine from 2018 is compared as well HISTORY: ORDERING SYSTEM PROVIDED HISTORY: possible head injury/numbness TECHNOLOGIST PROVIDED HISTORY: Reason for exam:->possible head injury/numbness Has a \"code stroke\" or \"stroke alert\" been called? ->No Is the patient pregnant?->No Reason for Exam: Fall (Pt. comes in by [facility] for complaints of a fall. Pt. reports that she tripped over a wire this morning and fell injuring her back. Pt. has chronic back issues. Pt. reports that ever since she has been anxious and unable to eat and having some abdominal pain; ORDERING SYSTEM PROVIDED HISTORY: fall TECHNOLOGIST PROVIDED HISTORY: Reason for exam:->fall Is the patient pregnant?->No Reason for Exam: Fall (Pt. comes in by [facility] for complaints of a fall. Pt. reports that she tripped over a wire this morning and fell injuring her back. Pt. has chronic back issues. Pt. reports that ever since she has been anxious and unable to eat and having some abdominal pain FINDINGS: BRAIN/VENTRICLES: There is no acute intracranial hemorrhage, mass effect or midline shift. No abnormal extra-axial fluid collection. The gray-white differentiation is maintained without evidence of an acute infarct. There is no evidence of hydrocephalus. Chronic periventricular white matter low densities are again noted most likely due to chronic microvascular ischemia. ORBITS: The visualized portion of the orbits demonstrate no acute abnormality. SINUSES: There is an air-fluid level in the right maxillary sinus, partially imaged, incompletely visualized. SOFT TISSUES/SKULL:  No acute abnormality of the visualized skull or soft tissues. CERVICAL SPINE: No fracture demonstrated throughout the cervical spine or visualized thoracic spine. No prevertebral soft tissue swelling or other acute traumatic findings surrounding the cervical spine. Advanced degenerative disc disease changes are present at C5-6. Mild degenerative changes elsewhere in the cervical spine involving the discs. Advanced left upper cervical spine facet degenerative changes also present. This is most severe at C2-3. Head: No acute intracranial abnormality. There is an air-fluid level of the right maxillary sinus most likely due to sinusitis. Cervical spine: No acute abnormality of the cervical spine. Ct Cervical Spine Wo Contrast    Result Date: 8/14/2020  EXAMINATION: CT OF THE HEAD WITHOUT CONTRAST; CT OF THE CERVICAL SPINE WITHOUT CONTRAST 8/14/2020 6:29 pm TECHNIQUE: CT of the head was performed without the administration of intravenous contrast. Dose modulation, iterative reconstruction, and/or weight based adjustment of the mA/kV was utilized to reduce the radiation dose to as low as reasonably achievable.; CT of the cervical spine was performed without the administration of intravenous contrast. Multiplanar reformatted images are provided for review. Dose modulation, iterative reconstruction, and/or weight based adjustment of the mA/kV was utilized to reduce the radiation dose to as low as reasonably achievable. COMPARISON: Head CT February 4, 2018. Cervical spine from 2018 is compared as well HISTORY: ORDERING SYSTEM PROVIDED HISTORY: possible head injury/numbness TECHNOLOGIST PROVIDED HISTORY: Reason for exam:->possible head injury/numbness Has a \"code stroke\" or \"stroke alert\" been called? ->No Is the patient pregnant?->No Reason for Exam: Fall (Pt. comes in by [facility] for complaints of a fall. Pt. reports that she tripped over a wire this morning and fell injuring her back. Pt. has chronic back issues.  Pt. reports that ever since she has been anxious and unable to eat and having some abdominal pain; ORDERING SYSTEM PROVIDED HISTORY: fall TECHNOLOGIST PROVIDED HISTORY: Reason for exam:->fall Is the patient pregnant?->No Reason for Exam: Fall (Pt. comes in by [facility] for complaints of a fall. Pt. reports that she tripped over a wire this morning and fell injuring her back. Pt. has chronic back issues. Pt. reports that ever since she has been anxious and unable to eat and having some abdominal pain FINDINGS: BRAIN/VENTRICLES: There is no acute intracranial hemorrhage, mass effect or midline shift. No abnormal extra-axial fluid collection. The gray-white differentiation is maintained without evidence of an acute infarct. There is no evidence of hydrocephalus. Chronic periventricular white matter low densities are again noted most likely due to chronic microvascular ischemia. ORBITS: The visualized portion of the orbits demonstrate no acute abnormality. SINUSES: There is an air-fluid level in the right maxillary sinus, partially imaged, incompletely visualized. SOFT TISSUES/SKULL:  No acute abnormality of the visualized skull or soft tissues. CERVICAL SPINE: No fracture demonstrated throughout the cervical spine or visualized thoracic spine. No prevertebral soft tissue swelling or other acute traumatic findings surrounding the cervical spine. Advanced degenerative disc disease changes are present at C5-6. Mild degenerative changes elsewhere in the cervical spine involving the discs. Advanced left upper cervical spine facet degenerative changes also present. This is most severe at C2-3. Head: No acute intracranial abnormality. There is an air-fluid level of the right maxillary sinus most likely due to sinusitis. Cervical spine: No acute abnormality of the cervical spine. Ct Thoracic Spine Wo Contrast    Result Date: 8/14/2020  EXAMINATION: CT OF THE ABDOMEN AND PELVIS WITHOUT CONTRAST; CT OF THE LUMBAR SPINE WITHOUT CONTRAST; CT OF THE THORACIC SPINE WITHOUT CONTRAST 8/14/2020 6:29 pm TECHNIQUE: CT of the abdomen and pelvis was performed without the administration of intravenous contrast. Multiplanar reformatted images are provided for review.  Dose modulation, iterative reconstruction, and/or weight based adjustment of the mA/kV was utilized to reduce the radiation dose to as low as reasonably achievable.; CT of the lumbar spine was performed without the administration of intravenous contrast. Multiplanar reformatted images are provided for review. Dose modulation, iterative reconstruction, and/or weight based adjustment of the mA/kV was utilized to reduce the radiation dose to as low as reasonably achievable.; CT of the thoracic spine was performed without the administration of intravenous contrast. Multiplanar reformatted images are provided for review. Dose modulation, iterative reconstruction, and/or weight based adjustment of the mA/kV was utilized to reduce the radiation dose to as low as reasonably achievable. COMPARISON: None. HISTORY: ORDERING SYSTEM PROVIDED HISTORY: abd pain TECHNOLOGIST PROVIDED HISTORY: Reason for exam:->abd pain Additional Contrast?->None Is the patient pregnant?->No Reason for Exam: Fall (Pt. comes in by [facility] for complaints of a fall. Pt. reports that she tripped over a wire this morning and fell injuring her back. Pt. has chronic back issues. Pt. reports that ever since she has been anxious and unable to eat and having some abdominal pain; ORDERING SYSTEM PROVIDED HISTORY: fall TECHNOLOGIST PROVIDED HISTORY: Reason for exam:->fall Is the patient pregnant?->No Reason for Exam: Fall (Pt. comes in by [facility] for complaints of a fall. Pt. reports that she tripped over a wire this morning and fell injuring her back. Pt. has chronic back issues. Pt. reports that ever since she has been anxious and unable to eat and having some abdominal pain Abdomen/Pelvis: There is a trace amount of dependent right pleural fluid. A moderate-sized pericardial effusion is present measuring up to 10 mm thickness. No evidence of acute or traumatic process of the visualized lower chest otherwise.  Organs: Limited due to lack of contrast.  No acute or traumatic findings the organs throughout the abdomen. GI/Bowel: No acute or traumatic findings involving bowel throughout the abdomen and pelvis. Pelvis: Normal appearance of the bladder. No acute or traumatic findings of the pelvic structures. Peritoneum/Retroperitoneum: No free air. No free fluid or hemoperitoneum. No evidence of acute or traumatic abnormality of the aorta. Bones/Soft Tissues: No fracture throughout the abdomen and pelvis. No hematoma of the body wall. Thoracic and lumbar spine: No fracture of the thoracic spine or the lumbar spine. Normal alignment. No significant degenerative changes in the thoracic spine. There is a large amount of degenerative disc disease change at L2-3 in the lumbar spine. Fusion has been performed of L4 and L5. Moderate degenerative changes at L5-S1. Advanced degenerative changes of the facet joints in the upper lumbar spine as well. Associated central canal spinal stenosis at L2-3 stenosis appears present at other levels obscured by artifact from the fusion hardware     Abdomen/pelvis: Moderate pericardial effusion and trace amount of right pleural fluid. These are new findings from the patient's abdomen/pelvis CT February 4, 2018. Is doubtful that this is due to trauma but of uncertain etiology. No noncontrast CT evidence of acute or traumatic process of the abdomen pelvis. Thoracic and lumbar spine: No acute or traumatic abnormality of the thoracic spine. No fracture     Ct Lumbar Spine Wo Contrast    Result Date: 8/14/2020  EXAMINATION: CT OF THE ABDOMEN AND PELVIS WITHOUT CONTRAST; CT OF THE LUMBAR SPINE WITHOUT CONTRAST; CT OF THE THORACIC SPINE WITHOUT CONTRAST 8/14/2020 6:29 pm TECHNIQUE: CT of the abdomen and pelvis was performed without the administration of intravenous contrast. Multiplanar reformatted images are provided for review.  Dose modulation, iterative reconstruction, and/or weight based adjustment of the mA/kV was utilized to reduce the radiation dose to as low as reasonably achievable.; CT of the lumbar spine was performed without the administration of intravenous contrast. Multiplanar reformatted images are provided for review. Dose modulation, iterative reconstruction, and/or weight based adjustment of the mA/kV was utilized to reduce the radiation dose to as low as reasonably achievable.; CT of the thoracic spine was performed without the administration of intravenous contrast. Multiplanar reformatted images are provided for review. Dose modulation, iterative reconstruction, and/or weight based adjustment of the mA/kV was utilized to reduce the radiation dose to as low as reasonably achievable. COMPARISON: None. HISTORY: ORDERING SYSTEM PROVIDED HISTORY: abd pain TECHNOLOGIST PROVIDED HISTORY: Reason for exam:->abd pain Additional Contrast?->None Is the patient pregnant?->No Reason for Exam: Fall (Pt. comes in by [facility] for complaints of a fall. Pt. reports that she tripped over a wire this morning and fell injuring her back. Pt. has chronic back issues. Pt. reports that ever since she has been anxious and unable to eat and having some abdominal pain; ORDERING SYSTEM PROVIDED HISTORY: fall TECHNOLOGIST PROVIDED HISTORY: Reason for exam:->fall Is the patient pregnant?->No Reason for Exam: Fall (Pt. comes in by [facility] for complaints of a fall. Pt. reports that she tripped over a wire this morning and fell injuring her back. Pt. has chronic back issues. Pt. reports that ever since she has been anxious and unable to eat and having some abdominal pain Abdomen/Pelvis: There is a trace amount of dependent right pleural fluid. A moderate-sized pericardial effusion is present measuring up to 10 mm thickness. No evidence of acute or traumatic process of the visualized lower chest otherwise. Organs: Limited due to lack of contrast.  No acute or traumatic findings the organs throughout the abdomen.  GI/Bowel: No acute or traumatic findings involving bowel throughout the abdomen and pelvis. Pelvis: Normal appearance of the bladder. No acute or traumatic findings of the pelvic structures. Peritoneum/Retroperitoneum: No free air. No free fluid or hemoperitoneum. No evidence of acute or traumatic abnormality of the aorta. Bones/Soft Tissues: No fracture throughout the abdomen and pelvis. No hematoma of the body wall. Thoracic and lumbar spine: No fracture of the thoracic spine or the lumbar spine. Normal alignment. No significant degenerative changes in the thoracic spine. There is a large amount of degenerative disc disease change at L2-3 in the lumbar spine. Fusion has been performed of L4 and L5. Moderate degenerative changes at L5-S1. Advanced degenerative changes of the facet joints in the upper lumbar spine as well. Associated central canal spinal stenosis at L2-3 stenosis appears present at other levels obscured by artifact from the fusion hardware     Abdomen/pelvis: Moderate pericardial effusion and trace amount of right pleural fluid. These are new findings from the patient's abdomen/pelvis CT February 4, 2018. Is doubtful that this is due to trauma but of uncertain etiology. No noncontrast CT evidence of acute or traumatic process of the abdomen pelvis. Thoracic and lumbar spine: No acute or traumatic abnormality of the thoracic spine. No fracture     Xr Chest Portable    Result Date: 8/15/2020  EXAMINATION: ONE XRAY VIEW OF THE CHEST 8/15/2020 2:35 pm COMPARISON: CTA abdomen/pelvis 08/14/2020. HISTORY: ORDERING SYSTEM PROVIDED HISTORY: sob TECHNOLOGIST PROVIDED HISTORY: Reason for exam:->sob Reason for Exam: CP, abd pain, numbness in all extremeties Acuity: Acute Type of Exam: Initial FINDINGS: Single view provided. Stable mediastinal and cardiac silhouettes. Stable right vascular stent. Stable pulmonary hyperinflation with no acute or progressive consolidation. No effusion or pneumothorax.   No free subdiaphragmatic air. The bowel gas pattern is normal.     Stable pulmonary hyperinflation. No acute pulmonary process. Cta Abdomen Pelvis W Contrast    Result Date: 8/15/2020  EXAMINATION: CTA OF THE ABDOMEN AND PELVIS WITH CONTRAST 8/14/2020 10:34 pm: TECHNIQUE: CTA of the abdomen and pelvis was performed with the administration of intravenous contrast. Multiplanar reformatted images are provided for review. MIP images are provided for review. Dose modulation, iterative reconstruction, and/or weight based adjustment of the mA/kV was utilized to reduce the radiation dose to as low as reasonably achievable. COMPARISON: None. HISTORY: ORDERING SYSTEM PROVIDED HISTORY: abdominal pain, hx of celiac artery stenosis TECHNOLOGIST PROVIDED HISTORY: Reason for exam:->abdominal pain, hx of celiac artery stenosis Reason for Exam: abdominal pain, hx of celiac artery stenosis Acuity: Acute Type of Exam: Initial FINDINGS: Vascular: Normal caliber included thoracic aorta without evidence of aneurysm or dissection. Normal caliber abdominal aorta without evidence of aneurysm, dissection or penetrating atherosclerotic ulcer. Widely patent visceral arterial branch vessels, with mild extrinsic narrowing of the proximal celiac artery. There seems to be a focal mixed calcified plaque at the celiac artery origin on the curved multiplanar reformatted views, subtly appreciated on other views, resulting in moderate narrowing. A portion of the proximal common iliac arteries and very distal abdominal aorta is obscured by streak artifact from lumbar fusion hardware. Included iliofemoral systems are patent, with the distal most portions obscured by motion. Nonvascular: LOWER CHEST Lung bases: Ground-glass opacities and atelectasis at the lung bases. Normal heart size with small pericardial effusion. ABDOMEN Liver: Normal liver size, contour and parenchymal attenuation.  Gallbladder: Normal. Biliary: No intra or extrahepatic bile duct dilatation. Pancreas: Normal. Spleen: Normal. Adrenals: Normal. Kidneys: Kidneys are symmetric in size and parenchymal enhancement. No hydronephrosis or nephrolithiasis. GI Tract: Normal distal esophagus, stomach and duodenal sweep. No apparent bowel wall thickening or obstruction. Appendix is not identified, however there is no pericecal inflammatory stranding or fluid. Large colonic stool volume. Peritoneum/Retroperitoneum: No enlarged lymph nodes, free air or free fluid. PELVIS Genitourinary: Normal urinary bladder. Status post hysterectomy. Other: No free fluid. No enlarged lymph nodes. MUSCULOSKELETAL Bones and Soft Tissues: No acute soft tissue or osseous abnormality. Status post L4-L5 discectomy and posterior fusion. No evidence of hardware complication. Upper lumbar spondylosis most pronounced at L2-L3 where there is advanced disc height loss, mild canal and foraminal narrowing. Right facet arthrosis appreciated at this level. No evidence of an acute aortic syndrome. Extrinsic narrowing of the proximal celiac artery resulting in mild narrowing, however suspect a mixed calcified plaque at the celiac artery origin (best appreciated on curved multiplanar reformatted views), which seems to result in at least moderate luminal narrowing at the origin. The former features may be seen with median arcuate ligament syndrome, perhaps intensified by atherosclerotic disease. No acute nonvascular findings in the abdomen or pelvis. Large colonic stool volume without obstruction. Query constipation. Small pericardial effusion. The appearance of ground-glass opacities at the lung bases likely reflects volume averaging of subsegmental atelectasis is opposed to areas of true airspace disease. Correlate for any respiratory symptoms.        ED Medication Orders (From admission, onward)    Start Ordered     Status Ordering Provider    08/14/20 2230 08/14/20 2225  LORazepam (ATIVAN) injection 1 mg  ONCE      Last MAR

## 2020-08-14 NOTE — ED PROVIDER NOTES
[address] [facility]        Pt Name: Pattie Love  MRN: [id number]  [place] 1965  Date of evaluation: 8/14/2020  Provider: Antwan North PA-C  PCP: Amadou Srinivasan, DO     I have seen and evaluated this patient with my supervising physician Landon Felipe MD.    [address] [place] [address]       Chief Complaint   Patient presents with    Fall     Pt. comes in by [facility] for complaints of a fall. Pt. reports that she tripped over a wire this morning and fell injuring her back. Pt. has chronic back issues. Pt. reports that ever since she has been anxious and unable to eat and having some abdominal pain. HISTORY OF PRESENT ILLNESS   (Location, Timing/Onset, Context/Setting, Quality, Duration, Modifying Factors, Severity, Associated Signs and Symptoms)  Note limiting factors. Pattie Love is a 47 y.o. female with history of candidal esophagitis, celiac artery stenosis, anxiety, chronic back pain, peptic ulcer disease, duodenal stenosis who presents to the emergency department with complaints of generalized abdominal discomfort, nausea and vomiting starting early this morning. She states that she was going back and forth to the restroom and tripped over her fan cord around 4 am.  She is unsure of head trauma, but states that she fell directly on her buttocks on the tile floor in the bathroom. Denies loss of consciousness. Since then, she states that she has had increased pain in her low back and neck. She reports numbness tingling sensation in both of her hands and right second toe. She denies any acute weakness. She is able to bear weight and ambulate however is increased pain with this. Dr. Sheila Warren at [facility] did her back surgery approximately 10 years ago. She has not seen a back specialist recently. She takes Neurontin for chronic pain.     Nursing Notes were all reviewed and agreed with or any disagreements were addressed in the HPI. REVIEW OF SYSTEMS    (2-9 systems for level 4, 10 or more for level 5)     Review of Systems   Constitutional: Negative for chills and fever. HENT: Negative. Eyes: Negative for visual disturbance. Respiratory: Negative for cough, shortness of breath, wheezing and stridor. Cardiovascular: Negative for chest pain, palpitations and leg swelling. Gastrointestinal: Positive for abdominal pain, nausea and vomiting. Negative for abdominal distention, anal bleeding, blood in stool, constipation, diarrhea and rectal pain. Endocrine: Negative. Genitourinary: Negative. Musculoskeletal: Negative for back pain, neck pain and neck stiffness. Skin: Negative for color change, pallor, rash and wound. Neurological: Negative for dizziness, tremors, seizures, syncope, facial asymmetry, speech difficulty, weakness, light-headedness, numbness and headaches. Psychiatric/Behavioral: Negative for confusion. All other systems reviewed and are negative. Positives and Pertinent negatives as per HPI. Except as noted above in the ROS, all other systems were reviewed and negative.        PAST MEDICAL HISTORY     Past Medical History:   Diagnosis Date    Candida esophagitis ([facility] [address] [address] 01/22/2018    On EGD    Celiac artery stenosis (HCC)     Childhood asthma     Chronic midline low back pain without sciatica     S/P Laminectomy 2004 / 2008    Chronic migraine w/o aura w/o status migrainosus, not intractable     Coronary artery disease involving native coronary artery of native heart without angina pectoris 06/2014    S/P Angioplasty and Stent x 1 / Dr. Desean Sandy Duodenal stenosis 7-28-15    Dilated with balloon per Dr. Charbel Cohn hypertension     Hiatal hernia 01/22/2018    On EGD    Hypercholesterolemia     Hyperthyroidism 12/29/2017    Movement disorder     PUD (peptic ulcer disease)     Subclavian artery stenosis ([facility] [address]     Tobacco abuse          SURGICAL HISTORY     Past Surgical History:   Procedure Laterality Date    BACK SURGERY      x 2    CARDIAC SURGERY      COLONOSCOPY      HYSTERECTOMY  1994    Partial    LAPAROSCOPY  1992    LAPAROTOMY  1993    Lysis of Adhesion    LUMBAR LAMINECTOMY  2004 / 2008    With fusion in 2008   [facility] GASTROINTESTINAL ENDOSCOPY  6/1/15    with biopsies and brushings     UPPER GASTROINTESTINAL ENDOSCOPY  7/28/15    UPPER GASTROINTESTINAL ENDOSCOPY  4/25/16    gastric biopsy, duodenal stenosis dilation     VASCULAR SURGERY  01/2015    Subclavian Artery Stent    WISDOM TOOTH EXTRACTION           CURRENTMEDICATIONS       Previous Medications    CLOPIDOGREL (PLAVIX) 75 MG TABLET    Take 1 tablet by mouth daily    GABAPENTIN (NEURONTIN) 600 MG TABLET        METOPROLOL SUCCINATE (TOPROL XL) 50 MG EXTENDED RELEASE TABLET    Take 50 mg by mouth daily    ONDANSETRON (ZOFRAN ODT) 4 MG DISINTEGRATING TABLET    Take 1-2 tablets by mouth every 8 hours as needed for Nausea May substitute the non ODT tablets if not covered financially by the insurance plan. ONDANSETRON (ZOFRAN) 4 MG TABLET    Take 4 mg by mouth every 8 hours as needed for Nausea or Vomiting (Nausea from migraine)    OXYCODONE-ACETAMINOPHEN (PERCOCET)  MG PER TABLET    Take 1 tablet by mouth every 6 hours as needed for Pain. PANTOPRAZOLE (PROTONIX) 40 MG TABLET    TAKE 1 TABLET BY MOUTH DAILY    PROMETHAZINE (PHENERGAN) 25 MG TABLET    Take 25 mg by mouth every 6 hours as needed for Nausea (Nausea due to migraines)    SPIRONOLACTONE (ALDACTONE) 100 MG TABLET    Take 1 tablet by mouth every evening    SUMATRIPTAN (IMITREX) 100 MG TABLET    TAKE ONE BY MOUTH AT ONSET OF MIGRAINE. MAY REPEAT ONCE IN 2 HRS. MAX OF 2 PER DAY.          ALLERGIES     Iodine and Penicillins    FAMILYHISTORY       Family History   Problem Relation Age of Onset    Cancer Mother         Breast     High Blood Pressure Father     Heart Disease Father         MI    Diabetes Paternal Aunt     Asthma Son     Asthma Daughter           SOCIAL HISTORY       Social History     Tobacco Use    Smoking status: Current Every Day Smoker     Packs/day: 0.50     Years: 10.00     Pack years: 5.00     Types: Cigarettes    Smokeless tobacco: Never Used   Substance Use Topics    Alcohol use: No    Drug use: No       SCREENINGS             PHYSICAL EXAM    (up to 7 for level 4, 8 or more for level 5)     ED Triage Vitals [08/14/20 1316]   BP Temp Temp Source Pulse Resp SpO2 Height Weight   (!) 148/80 97.5 °F (36.4 °C) Oral 85 18 99 % 5' 3\" (1.6 m) 112 lb (50.8 kg)       Physical Exam  Vitals signs and nursing note reviewed. Constitutional:       Appearance: Normal appearance. She is well-developed. She is not toxic-appearing or diaphoretic. HENT:      Head: Normocephalic and atraumatic. Right Ear: Tympanic membrane, ear canal and external ear normal.      Left Ear: Tympanic membrane, ear canal and external ear normal.      Nose: Nose normal.      Mouth/Throat:      Mouth: Mucous membranes are moist.      Pharynx: Oropharynx is clear. Eyes:      General: No scleral icterus. Right eye: No discharge. Left eye: No discharge. Extraocular Movements: Extraocular movements intact. Conjunctiva/sclera: Conjunctivae normal.      Pupils: Pupils are equal, round, and reactive to light. Neck:      Musculoskeletal: Full passive range of motion without pain, normal range of motion and neck supple. Trachea: Trachea and phonation normal.      Meningeal: Brudzinski's sign and Kernig's sign absent. Cardiovascular:      Rate and Rhythm: Normal rate. Pulses:           Carotid pulses are 2+ on the right side and 2+ on the left side. Radial pulses are 2+ on the right side and 2+ on the left side. Femoral pulses are 2+ on the right side and 2+ on the left side.        Dorsalis pedis pulses are 2+ on the right side and 2+ on the left side. Posterior tibial pulses are 2+ on the right side and 2+ on the left side. Pulmonary:      Effort: Pulmonary effort is normal.      Breath sounds: Normal breath sounds. Abdominal:      General: Bowel sounds are normal. There is no abdominal bruit. Palpations: Abdomen is soft. There is no pulsatile mass. Tenderness: There is generalized abdominal tenderness. There is no right CVA tenderness, left CVA tenderness, guarding or rebound. Negative signs include Leos's sign, Rovsing's sign, McBurney's sign, psoas sign and obturator sign. Musculoskeletal:      Cervical back: She exhibits tenderness. Thoracic back: Normal.      Lumbar back: She exhibits decreased range of motion, tenderness and spasm. Comments: No midline vertebral tenderness or step-off deformity. Negative straight leg raise. No saddle paresthesia or foot drop. Able to heel and toe walk without difficulty or deficit. 5/5 strength in all four extremities without focal weakness or radiculopathy   Skin:     General: Skin is warm and dry. Capillary Refill: Capillary refill takes less than 2 seconds. Coloration: Skin is not jaundiced or pale. Findings: No bruising, erythema, lesion or rash. Neurological:      General: No focal deficit present. Mental Status: She is alert and oriented to person, place, and time. Cranial Nerves: No cranial nerve deficit (II-XII intact). Sensory: Sensory deficit: subjective tingling in bilateral hands and numbness in distal right second toe without acute skin changes, poikilothermia, pallor. Psychiatric:         Attention and Perception: Attention and perception normal.         Mood and Affect: Mood is anxious. Speech: Speech normal.         Behavior: Behavior normal. Behavior is cooperative. Thought Content:  Thought content normal.         Cognition and Memory: Cognition and memory normal.         Judgment: Judgment evidence of acute or traumatic process of the   abdomen pelvis. Thoracic and lumbar spine: No acute or traumatic abnormality of the thoracic   spine. No fracture         CT LUMBAR SPINE WO CONTRAST   Final Result   Abdomen/pelvis: Moderate pericardial effusion and trace amount of right   pleural fluid. These are new findings from the patient's abdomen/pelvis CT   February 4, 2018. Is doubtful that this is due to trauma but of uncertain   etiology. No noncontrast CT evidence of acute or traumatic process of the   abdomen pelvis. Thoracic and lumbar spine: No acute or traumatic abnormality of the thoracic   spine. No fracture         CT THORACIC SPINE WO CONTRAST   Final Result   Abdomen/pelvis: Moderate pericardial effusion and trace amount of right   pleural fluid. These are new findings from the patient's abdomen/pelvis CT   February 4, 2018. Is doubtful that this is due to trauma but of uncertain   etiology. No noncontrast CT evidence of acute or traumatic process of the   abdomen pelvis. Thoracic and lumbar spine: No acute or traumatic abnormality of the thoracic   spine. No fracture         CT CERVICAL SPINE WO CONTRAST   Final Result   Head: No acute intracranial abnormality. There is an air-fluid level of the   right maxillary sinus most likely due to sinusitis. Cervical spine: No acute abnormality of the cervical spine. CT HEAD WO CONTRAST   Final Result   Head: No acute intracranial abnormality. There is an air-fluid level of the   right maxillary sinus most likely due to sinusitis. Cervical spine: No acute abnormality of the cervical spine. XR CHEST PORTABLE   Preliminary Result   No acute cardiopulmonary process. No results found. PROCEDURES   Unless otherwise noted below, none     Procedures    CRITICAL CARE TIME   N/A    CONSULTS:  IP CONSULT TO CARDIOLOGY  IP CONSULT TO HOSPITALIST  My attending spoke with Dr Camilla Major, cardiologist at [facility]. See his note for details regarding this conversation. My attending consulted with Everardo Pedorza, hospitalist, [place] [id number] [address]. EMERGENCY DEPARTMENT COURSE and DIFFERENTIAL DIAGNOSIS/MDM:   Vitals:    Vitals:    08/14/20 1729 08/14/20 1755 08/14/20 1915 08/14/20 1927   BP:       Pulse: 84 76 70 73   Resp: 24 18 21 18   Temp:       TempSrc:       SpO2:   99% 95%   Weight:       Height:           Patient was given the following medications:  Medications   iopamidol (ISOVUE-370) 76 % injection 75 mL (has no administration in time range)   promethazine (PHENERGAN) injection 25 mg (has no administration in time range)   methylPREDNISolone sodium (SOLU-MEDROL) injection 125 mg (125 mg Intravenous Given 8/14/20 1727)   ondansetron (ZOFRAN) injection 4 mg (4 mg Intravenous Given 8/14/20 1727)   morphine injection 4 mg (4 mg Intravenous Given 8/14/20 1728)   0.9 % sodium chloride bolus (1,000 mLs Intravenous New Bag 8/14/20 1727)           This patient presents to the emergency department complaining of abdominal pain, nausea and vomiting starting earlier this morning. CT abdomen and pelvis is unremarkable. However, does make mention of a moderate sized pericardial effusion. Patient denies chest pain or shortness of breath. EKG appears stable. Troponin is negative. Patient also reports some tingling sensation in all 5 digits of both hands and a numbness sensation over the second right toe. Capillary refill is less than 2 seconds. CT scan of the head, cervical spine, thoracic spine and lumbar spine all appear stable at this time.   My suspicion is low for cauda equina, central cord syndrome, pneumonia, pneumothorax, COVID-19, intussusception, acute intrathoracic/retroperitoneal/intra-abdominal injury/laceration/hematoma, traumatic pericardial effusion, ACS, PE, myocarditis, pericarditis, endocarditis, acute pulmonary edema,  cardiac tamponade, cardiomyopathy, CHF exacerbation, thoracic aortic dissection, esophageal rupture, other life-threatening arrhythmia, hypertensive urgency or emergency, hemothorax, pulmonary contusion, subcutaneous emphysema, flail chest, pneumo mediastinum, rib fracture , carotid dissection, sinus abscess, acute fracture, acute CVA, ICH, SAH, TIA, meningitis, encephalitis, pseudotumor cerebri, temporal arteritis, sentinel bleed from ruptured aneurysm, hypertensive urgency or emergency, subdural hematoma, epidural hematoma, skull or facial fracture, postconcussive syndrome,subungual hematoma, paronychia, eponychia, felon, flexor tenosynovitis, thoracic outlet obstruction, SVC syndrome, foreign body, tendon rupture, compartment syndrome, acute fracture, dislocation, DVT, arterial compromise or occlusion, limb ischemia, gout, septic joint, abscess, cellulitis, osteomyelitis,acute surgical abdomen, obstruction, perforation, abscess, mesenteric ischemia, AAA, dissection, cholecystitis, cholangitis, pancreatitis, appendicitis, C. diff colitis, diverticulitis, volvulus, incarcerated hernia, necrotizing fasciitis, TOA, ovarian torsion, PID, ectopic pregnancy, darnell seble Charlie syndrome,  pyelonephritis, perinephric abscess, kidney stone, urosepsis, fistula, acute spine fracture or dislocation, epidural abscess or hematoma, discitis, meningitis, encephalitis, transverse myelitis,  or other concerning pathology. We have addressed her concerns and expectations. FINAL IMPRESSION      1. Pericardial effusion    2. Paresthesia    3. Acute exacerbation of chronic low back pain    4. Strain of neck muscle, initial encounter    5. Acute abdominal pain    6. Non-intractable vomiting with nausea, unspecified vomiting type          DISPOSITION/PLAN   DISPOSITION Decision To Admit 08/14/2020 07:40:17 PM      PATIENT REFERREDTO:  No follow-up provider specified.     DISCHARGE MEDICATIONS:  New Prescriptions    No medications on file       DISCONTINUED MEDICATIONS:  Discontinued Medications    No medications on file              (Please note that portions of this note were completed with a voice recognition program.  Efforts were made to edit the dictations but occasionally words are mis-transcribed.)    Arun Gutierrez PA-C (electronically signed)           Arun Gutierrez PA-C  08/14/20 2014

## 2020-08-14 NOTE — ED NOTES
Pt upset she does not have a pillow or warm blanket rn will get her a pillow and warm blanket     Ulises Rees RN  08/14/20 9035

## 2020-08-14 NOTE — ED PROVIDER NOTES
I did not perform a face-to-face evaluation of the patient. I did interpret the patient's EKG as noted below: The Ekg interpreted by me shows  normal sinus rhythm with a rate of 80  Axis is   Normal  QTc is  normal  Intervals and Durations are unremarkable.       ST Segments: no acute change  No significant change from prior EKG dated 2/4/18           Janice Mensah MD  08/14/20 2333

## 2020-08-15 ENCOUNTER — APPOINTMENT (OUTPATIENT)
Dept: GENERAL RADIOLOGY | Age: 55
End: 2020-08-15
Payer: MEDICARE

## 2020-08-15 ENCOUNTER — HOSPITAL ENCOUNTER (OUTPATIENT)
Age: 55
Setting detail: OBSERVATION
Discharge: HOME | End: 2020-08-19
Attending: EMERGENCY MEDICINE | Admitting: FAMILY MEDICINE
Payer: MEDICARE

## 2020-08-15 LAB
ANION GAP SERPL CALCULATED.3IONS-SCNC: 11 MMOL/L (ref 3–16)
BASOPHILS ABSOLUTE: 0.1 K/UL (ref 0–0.2)
BASOPHILS RELATIVE PERCENT: 0.7 %
BUN BLDV-MCNC: 19 MG/DL (ref 7–20)
CALCIUM SERPL-MCNC: 10.2 MG/DL (ref 8.3–10.6)
CHLORIDE BLD-SCNC: 100 MMOL/L (ref 99–110)
CO2: 24 MMOL/L (ref 21–32)
CREAT SERPL-MCNC: 1 MG/DL (ref 0.6–1.1)
EKG ATRIAL RATE: 80 BPM
EKG DIAGNOSIS: NORMAL
EKG P AXIS: 37 DEGREES
EKG P-R INTERVAL: 150 MS
EKG Q-T INTERVAL: 376 MS
EKG QRS DURATION: 82 MS
EKG QTC CALCULATION (BAZETT): 433 MS
EKG R AXIS: 49 DEGREES
EKG T AXIS: 67 DEGREES
EKG VENTRICULAR RATE: 80 BPM
EOSINOPHILS ABSOLUTE: 0 K/UL (ref 0–0.6)
EOSINOPHILS RELATIVE PERCENT: 0.1 %
GFR AFRICAN AMERICAN: >60
GFR NON-AFRICAN AMERICAN: 58
GLUCOSE BLD-MCNC: 89 MG/DL (ref 70–99)
HCT VFR BLD CALC: 34.9 % (ref 36–48)
HEMOGLOBIN: 12.1 G/DL (ref 12–16)
INR BLD: 1.01 (ref 0.86–1.14)
LYMPHOCYTES ABSOLUTE: 1.4 K/UL (ref 1–5.1)
LYMPHOCYTES RELATIVE PERCENT: 17.2 %
MCH RBC QN AUTO: 34.9 PG (ref 26–34)
MCHC RBC AUTO-ENTMCNC: 34.6 G/DL (ref 31–36)
MCV RBC AUTO: 100.6 FL (ref 80–100)
MONOCYTES ABSOLUTE: 0.4 K/UL (ref 0–1.3)
MONOCYTES RELATIVE PERCENT: 4.9 %
NEUTROPHILS ABSOLUTE: 6.2 K/UL (ref 1.7–7.7)
NEUTROPHILS RELATIVE PERCENT: 77.1 %
PDW BLD-RTO: 16.2 % (ref 12.4–15.4)
PLATELET # BLD: 228 K/UL (ref 135–450)
PMV BLD AUTO: 8.4 FL (ref 5–10.5)
POTASSIUM REFLEX MAGNESIUM: 3.7 MMOL/L (ref 3.5–5.1)
PRO-BNP: 222 PG/ML (ref 0–124)
PROTHROMBIN TIME: 11.7 SEC (ref 10–13.2)
RBC # BLD: 3.46 M/UL (ref 4–5.2)
SODIUM BLD-SCNC: 135 MMOL/L (ref 136–145)
T4 FREE: 0.2 NG/DL (ref 0.9–1.8)
TROPONIN: <0.01 NG/ML
TSH REFLEX: 44.49 UIU/ML (ref 0.27–4.2)
WBC # BLD: 8 K/UL (ref 4–11)

## 2020-08-15 PROCEDURE — 83880 ASSAY OF NATRIURETIC PEPTIDE: CPT

## 2020-08-15 PROCEDURE — 80048 BASIC METABOLIC PNL TOTAL CA: CPT

## 2020-08-15 PROCEDURE — 93005 ELECTROCARDIOGRAM TRACING: CPT | Performed by: EMERGENCY MEDICINE

## 2020-08-15 PROCEDURE — 6360000002 HC RX W HCPCS: Performed by: FAMILY MEDICINE

## 2020-08-15 PROCEDURE — 96376 TX/PRO/DX INJ SAME DRUG ADON: CPT

## 2020-08-15 PROCEDURE — 71045 X-RAY EXAM CHEST 1 VIEW: CPT

## 2020-08-15 PROCEDURE — G0378 HOSPITAL OBSERVATION PER HR: HCPCS

## 2020-08-15 PROCEDURE — 96372 THER/PROPH/DIAG INJ SC/IM: CPT

## 2020-08-15 PROCEDURE — 96375 TX/PRO/DX INJ NEW DRUG ADDON: CPT

## 2020-08-15 PROCEDURE — 6360000002 HC RX W HCPCS: Performed by: PHYSICIAN ASSISTANT

## 2020-08-15 PROCEDURE — 2580000003 HC RX 258: Performed by: FAMILY MEDICINE

## 2020-08-15 PROCEDURE — 85025 COMPLETE CBC W/AUTO DIFF WBC: CPT

## 2020-08-15 PROCEDURE — 93010 ELECTROCARDIOGRAM REPORT: CPT | Performed by: INTERNAL MEDICINE

## 2020-08-15 PROCEDURE — 96374 THER/PROPH/DIAG INJ IV PUSH: CPT

## 2020-08-15 PROCEDURE — 6360000002 HC RX W HCPCS: Performed by: EMERGENCY MEDICINE

## 2020-08-15 PROCEDURE — 36415 COLL VENOUS BLD VENIPUNCTURE: CPT

## 2020-08-15 PROCEDURE — 6370000000 HC RX 637 (ALT 250 FOR IP): Performed by: FAMILY MEDICINE

## 2020-08-15 PROCEDURE — 99285 EMERGENCY DEPT VISIT HI MDM: CPT

## 2020-08-15 PROCEDURE — 85610 PROTHROMBIN TIME: CPT

## 2020-08-15 PROCEDURE — 84484 ASSAY OF TROPONIN QUANT: CPT

## 2020-08-15 RX ORDER — PROMETHAZINE HYDROCHLORIDE 25 MG/1
25 TABLET ORAL EVERY 6 HOURS PRN
Status: DISCONTINUED | OUTPATIENT
Start: 2020-08-15 | End: 2020-08-15 | Stop reason: SDUPTHER

## 2020-08-15 RX ORDER — POLYETHYLENE GLYCOL 3350 17 G/17G
17 POWDER, FOR SOLUTION ORAL DAILY PRN
Status: DISCONTINUED | OUTPATIENT
Start: 2020-08-15 | End: 2020-08-19 | Stop reason: HOSPADM

## 2020-08-15 RX ORDER — METOPROLOL SUCCINATE 50 MG/1
50 TABLET, EXTENDED RELEASE ORAL DAILY
Status: DISCONTINUED | OUTPATIENT
Start: 2020-08-15 | End: 2020-08-17

## 2020-08-15 RX ORDER — MORPHINE SULFATE 4 MG/ML
4 INJECTION, SOLUTION INTRAMUSCULAR; INTRAVENOUS ONCE
Status: COMPLETED | OUTPATIENT
Start: 2020-08-15 | End: 2020-08-15

## 2020-08-15 RX ORDER — SODIUM CHLORIDE 0.9 % (FLUSH) 0.9 %
10 SYRINGE (ML) INJECTION PRN
Status: DISCONTINUED | OUTPATIENT
Start: 2020-08-15 | End: 2020-08-19 | Stop reason: HOSPADM

## 2020-08-15 RX ORDER — SODIUM CHLORIDE 0.9 % (FLUSH) 0.9 %
10 SYRINGE (ML) INJECTION EVERY 12 HOURS SCHEDULED
Status: DISCONTINUED | OUTPATIENT
Start: 2020-08-15 | End: 2020-08-19 | Stop reason: HOSPADM

## 2020-08-15 RX ORDER — MORPHINE SULFATE 2 MG/ML
2 INJECTION, SOLUTION INTRAMUSCULAR; INTRAVENOUS EVERY 4 HOURS PRN
Status: DISCONTINUED | OUTPATIENT
Start: 2020-08-15 | End: 2020-08-19 | Stop reason: HOSPADM

## 2020-08-15 RX ORDER — DIPHENHYDRAMINE HYDROCHLORIDE 50 MG/ML
25 INJECTION INTRAMUSCULAR; INTRAVENOUS ONCE
Status: COMPLETED | OUTPATIENT
Start: 2020-08-15 | End: 2020-08-15

## 2020-08-15 RX ORDER — ACETAMINOPHEN 325 MG/1
650 TABLET ORAL EVERY 6 HOURS PRN
Status: DISCONTINUED | OUTPATIENT
Start: 2020-08-15 | End: 2020-08-19 | Stop reason: HOSPADM

## 2020-08-15 RX ORDER — PANTOPRAZOLE SODIUM 40 MG/1
40 TABLET, DELAYED RELEASE ORAL
Status: DISCONTINUED | OUTPATIENT
Start: 2020-08-15 | End: 2020-08-19

## 2020-08-15 RX ORDER — ONDANSETRON 2 MG/ML
4 INJECTION INTRAMUSCULAR; INTRAVENOUS EVERY 6 HOURS PRN
Status: DISCONTINUED | OUTPATIENT
Start: 2020-08-15 | End: 2020-08-19 | Stop reason: HOSPADM

## 2020-08-15 RX ORDER — ACETAMINOPHEN 650 MG/1
650 SUPPOSITORY RECTAL EVERY 6 HOURS PRN
Status: DISCONTINUED | OUTPATIENT
Start: 2020-08-15 | End: 2020-08-19 | Stop reason: HOSPADM

## 2020-08-15 RX ORDER — KETOROLAC TROMETHAMINE 30 MG/ML
15 INJECTION, SOLUTION INTRAMUSCULAR; INTRAVENOUS EVERY 6 HOURS PRN
Status: DISCONTINUED | OUTPATIENT
Start: 2020-08-15 | End: 2020-08-18

## 2020-08-15 RX ORDER — CLOPIDOGREL BISULFATE 75 MG/1
75 TABLET ORAL DAILY
Status: DISCONTINUED | OUTPATIENT
Start: 2020-08-15 | End: 2020-08-19 | Stop reason: HOSPADM

## 2020-08-15 RX ORDER — GABAPENTIN 400 MG/1
800 CAPSULE ORAL 3 TIMES DAILY
Status: DISCONTINUED | OUTPATIENT
Start: 2020-08-15 | End: 2020-08-19 | Stop reason: HOSPADM

## 2020-08-15 RX ORDER — HALOPERIDOL 5 MG/ML
5 INJECTION INTRAMUSCULAR ONCE
Status: COMPLETED | OUTPATIENT
Start: 2020-08-15 | End: 2020-08-15

## 2020-08-15 RX ORDER — KETOROLAC TROMETHAMINE 30 MG/ML
30 INJECTION, SOLUTION INTRAMUSCULAR; INTRAVENOUS ONCE
Status: COMPLETED | OUTPATIENT
Start: 2020-08-15 | End: 2020-08-15

## 2020-08-15 RX ORDER — PROMETHAZINE HYDROCHLORIDE 25 MG/1
12.5 TABLET ORAL EVERY 6 HOURS PRN
Status: DISCONTINUED | OUTPATIENT
Start: 2020-08-15 | End: 2020-08-19 | Stop reason: HOSPADM

## 2020-08-15 RX ADMIN — KETOROLAC TROMETHAMINE 30 MG: 30 INJECTION, SOLUTION INTRAMUSCULAR at 15:30

## 2020-08-15 RX ADMIN — MORPHINE SULFATE 2 MG: 2 INJECTION, SOLUTION INTRAMUSCULAR; INTRAVENOUS at 19:29

## 2020-08-15 RX ADMIN — GABAPENTIN 800 MG: 400 CAPSULE ORAL at 19:29

## 2020-08-15 RX ADMIN — KETOROLAC TROMETHAMINE 15 MG: 30 INJECTION, SOLUTION INTRAMUSCULAR at 18:40

## 2020-08-15 RX ADMIN — PANTOPRAZOLE SODIUM 40 MG: 40 TABLET, DELAYED RELEASE ORAL at 18:40

## 2020-08-15 RX ADMIN — ENOXAPARIN SODIUM 40 MG: 40 INJECTION SUBCUTANEOUS at 18:42

## 2020-08-15 RX ADMIN — MORPHINE SULFATE 2 MG: 2 INJECTION, SOLUTION INTRAMUSCULAR; INTRAVENOUS at 23:26

## 2020-08-15 RX ADMIN — DIPHENHYDRAMINE HYDROCHLORIDE 25 MG: 50 INJECTION, SOLUTION INTRAMUSCULAR; INTRAVENOUS at 15:30

## 2020-08-15 RX ADMIN — Medication 10 ML: at 19:29

## 2020-08-15 RX ADMIN — HALOPERIDOL LACTATE 5 MG: 5 INJECTION, SOLUTION INTRAMUSCULAR at 15:30

## 2020-08-15 RX ADMIN — CLOPIDOGREL 75 MG: 75 TABLET, FILM COATED ORAL at 18:40

## 2020-08-15 RX ADMIN — METOPROLOL SUCCINATE 50 MG: 50 TABLET, EXTENDED RELEASE ORAL at 18:40

## 2020-08-15 RX ADMIN — MORPHINE SULFATE 4 MG: 4 INJECTION INTRAVENOUS at 15:07

## 2020-08-15 RX ADMIN — ONDANSETRON 4 MG: 2 INJECTION INTRAMUSCULAR; INTRAVENOUS at 18:41

## 2020-08-15 ASSESSMENT — PAIN DESCRIPTION - DESCRIPTORS
DESCRIPTORS: SHARP
DESCRIPTORS: TIGHTNESS

## 2020-08-15 ASSESSMENT — PAIN SCALES - GENERAL
PAINLEVEL_OUTOF10: 10
PAINLEVEL_OUTOF10: 9
PAINLEVEL_OUTOF10: 8
PAINLEVEL_OUTOF10: 9
PAINLEVEL_OUTOF10: 0
PAINLEVEL_OUTOF10: 9

## 2020-08-15 ASSESSMENT — PAIN DESCRIPTION - LOCATION
LOCATION: BACK
LOCATION: CHEST
LOCATION: BACK

## 2020-08-15 ASSESSMENT — PAIN DESCRIPTION - PAIN TYPE
TYPE: ACUTE PAIN

## 2020-08-15 NOTE — ED NOTES
Pt to floor 3T via stretcher, pts belonging sent w her, pt calm and awake     Brit Ash, RN  08/14/20 5158

## 2020-08-15 NOTE — ED NOTES
PA in room from hospital to admit, RN told PA pt has pain and in need of pain medication, rn notified PA of medication that did not help pt pain,      Lanie Marte RN  08/14/20 2110

## 2020-08-15 NOTE — H&P
used smokeless tobacco. She reports that she does not drink alcohol or use drugs. Family History:  family history includes Asthma in her daughter and son; Cancer in her mother; Diabetes in her paternal aunt; Heart Disease in her father; High Blood Pressure in her father. ,     Physical Exam:  BP (!) 149/95   Pulse 78   Temp 98.3 °F (36.8 °C) (Axillary)   Resp 16   Ht 5' 3\" (1.6 m)   Wt 119 lb (54 kg)   SpO2 98%   BMI 21.08 kg/m²     General appearance:  Appears comfortable. Well nourished  Eyes: Sclera clear, pupils equal  ENT: Moist mucus membranes, no thrush. Trachea midline. Cardiovascular: Regular rhythm, normal S1, S2. No murmur, gallop, rub. No edema in lower extremities  Respiratory: Clear to auscultation bilaterally, no wheeze, good inspiratory effort  Gastrointestinal: Abdomen soft, non-tender, not distended, normal bowel sounds  Musculoskeletal: No cyanosis in digits, neck supple  Neurology: Cranial nerves grossly intact. Alert and oriented in time, place and person. No speech or motor deficits  Psychiatry: Appropriate affect. Not agitated  Skin: Warm, dry, normal turgor, no rash    Labs:  CBC:   Lab Results   Component Value Date    WBC 8.0 08/15/2020    RBC 3.46 08/15/2020    HGB 12.1 08/15/2020    HCT 34.9 08/15/2020    .6 08/15/2020    MCH 34.9 08/15/2020    MCHC 34.6 08/15/2020    RDW 16.2 08/15/2020     08/15/2020    MPV 8.4 08/15/2020     BMP:    Lab Results   Component Value Date     08/15/2020    K 3.7 08/15/2020     08/15/2020    CO2 24 08/15/2020    BUN 19 08/15/2020    CREATININE 1.0 08/15/2020    CALCIUM 10.2 08/15/2020    GFRAA >60 08/15/2020    GFRAA >60 12/11/2009    LABGLOM 58 08/15/2020    GLUCOSE 89 08/15/2020       Chest Xray:   EKG:    I visualized CXR images and EKG strips     Discussed  with      Problem List  Active Problems:    Chest pain  Resolved Problems:    * No resolved hospital problems.  *        Assessment/Plan:     47 y.o. female presents with c/o abdominal pain , dyspnea, nausea, that started one day ago. She also sustained a fall . No LOC . + hit her head. C/o numbness and tingling . She was seen at [facility] one day ago. CT spine was neg for hemorrhage or fracture   CT abdomen and pelvis showed moderate pericardial effusion. The pt was admitted for further work up. But she left AMA. She continues to experience the pain  So returned. The pt is now agreeable for admission for further work up. - Will get ECHO   Consider cardiology consult  Troponin is normal     Admit as obs. I anticipate hospitalization spanning less than two midnights for investigation and treatment of the above medically necessary diagnoses.       Tiara Antony MD    8/15/2020 7:37 PM

## 2020-08-15 NOTE — ED NOTES
Pt called out in pain, RN released floor order and will give pain medication per júnior Sow RN  08/14/20 4702

## 2020-08-15 NOTE — ED NOTES
Bed: 20  Expected date:   Expected time:   Means of arrival:   Comments:  emma Jo, RN  08/15/20 5759

## 2020-08-15 NOTE — DISCHARGE SUMMARY
Pt signed out [place] at [facility] with Ksenia DU and Anita Fontenot RN as witnesses. Pt cited nothing being done as reason. Pt called sister for ride. Pt taken to ED door in wheelchair to wiat for sister.   Anita gunderson RN

## 2020-08-15 NOTE — ED PROVIDER NOTES
Ethan Vinson        Pt Name: Amy Barron  MRN: [id number]  [place] 1965  Date of evaluation: 8/15/2020  Provider: Jenn Lara PA-C  PCP: Lianne Gifford, DO     I have seen and evaluated this patient with my supervising physician Laura Andino MD.    [facility]       Chief Complaint   Patient presents with    Chest Pain     IN VIA [facility] FROM HOME. SEEN YESTERAY FOR ABD PAIN. ALSO STATES TINGLING AND NUMBNESS IN ALL EXTREMETIES. PT STATES SHE SIGNED OUT AMA YESTERDAY. HISTORY OF PRESENT ILLNESS   (Location, Timing/Onset, Context/Setting, Quality, Duration, Modifying Factors, Severity, Associated Signs and Symptoms)  Note limiting factors. Amy Barron is a 47 y.o. female presents to the emergency department with a chief complaint of worsening chest tightness and shortness of breath. She was seen here yesterday after she tripped on a fan cord at 4 AM yesterday falling onto her buttocks. Had large work-up performed including multiple CT imaging which was only positive for a moderate pericardial effusion. She was admitted for echocardiogram and further work-up but apparently got too anxious and signed out [address] She states when she became worse throughout the night and today she presents back to the emergency department. She continues to smoke. She has history of chronic low back pain, duodenal stenosis, celiac artery stenosis, peptic ulcer disease, hyperlipidemia, hypertension. Denies illicit drug use. Has had back pain for the past 10 years requiring surgery 10 years ago. Denies loss of control of bowel or bladder function, dysuria, hematuria, diarrhea, bloody stool, cough, fevers. States she is nauseous but denies vomiting. States she continues to have persistent tingling in her bilateral arms and her right second toe. Still able to move her extremities.   She states she is been able to ambulate at home. She is on gabapentin for pain at home. Rates her pain a 9 out of 10. Denies any aggravating alleviating factors for the pain. Nursing Notes were all reviewed and agreed with or any disagreements were addressed in the HPI. REVIEW OF SYSTEMS    (2-9 systems for level 4, 10 or more for level 5)     Review of Systems    Positives and Pertinent negatives as per HPI. Except as noted above in the ROS, all other systems were reviewed and negative.        PAST MEDICAL HISTORY     Past Medical History:   Diagnosis Date    Candida esophagitis ([address] 01/22/2018    On EGD    C[facility]ac artery stenosis [facility]     Childhood asthma     Chronic midline low back pain without sciatica     S/P Laminectomy 2004 / 2008    Chronic migraine w/o aura w/o status migrainosus, not intractable     Coronary artery disease involving native coronary artery of native heart without angina pectoris 06/2014    S/P Angioplasty and Stent x 1 / Dr. Elyssa Finn Duodenal stenosis 7-28-15    Dilated with balloon per Dr. Marilyn Mcbride hypertension     Hiatal hernia 01/22/2018    On EGD    Hypercholesterolemia     Hyperthyroidism 12/29/2017    Movement disorder     PUD (peptic ulcer disease)     Subclavian artery stenosis ([facility]     Tobacco abuse          SURGICAL HISTORY     Past Surgical History:   Procedure Laterality Date    BACK SURGERY      x 2    CARDIAC SURGERY      COLONOSCOPY      HYSTERECTOMY  1994    Partial    LAPAROSCOPY  1992    LAPAROTOMY  1993    Lysis of Adhesion    LUMBAR LAMINECTOMY  2004 / 2008    With fusion in 2008   [facility] GASTROINTESTINAL ENDOSCOPY  6/1/15    with biopsies and brushings     UPPER GASTROINTESTINAL ENDOSCOPY  7/28/15    UPPER GASTROINTESTINAL ENDOSCOPY  4/25/16    gastric biopsy, duodenal stenosis dilation     VASCULAR SURGERY  01/2015    Subclavian Artery Stent    WISDOM TOOTH EXTRACTION           CURRENTMEDICATIONS Current Discharge Medication List      CONTINUE these medications which have NOT CHANGED    Details   pantoprazole (PROTONIX) 40 MG tablet TAKE 1 TABLET BY MOUTH DAILY  Qty: 30 tablet, Refills: 1      ondansetron (ZOFRAN ODT) 4 MG disintegrating tablet Take 1-2 tablets by mouth every 8 hours as needed for Nausea May substitute the non ODT tablets if not covered financially by the insurance plan. Qty: 12 tablet, Refills: 0      gabapentin (NEURONTIN) 600 MG tablet       metoprolol succinate (TOPROL XL) 50 MG extended release tablet Take 50 mg by mouth daily      promethazine (PHENERGAN) 25 MG tablet Take 25 mg by mouth every 6 hours as needed for Nausea (Nausea due to migraines)      clopidogrel (PLAVIX) 75 MG tablet Take 1 tablet by mouth daily  Qty: 30 tablet, Refills: 5    Associated Diagnoses: Celiac artery stenosis (HCC)      SUMAtriptan (IMITREX) 100 MG tablet TAKE ONE BY MOUTH AT ONSET OF MIGRAINE. MAY REPEAT ONCE IN 2 HRS. MAX OF 2 PER DAY.   Qty: 9 tablet, Refills: 5    Associated Diagnoses: Chronic migraine w/o aura w/o status migrainosus, not intractable               ALLERGIES     Iodine and Penicillins    FAMILYHISTORY       Family History   Problem Relation Age of Onset    Cancer Mother         Breast     High Blood Pressure Father     Heart Disease Father         MI    Diabetes Paternal Aunt     Asthma Son     Asthma Daughter           SOCIAL HISTORY       Social History     Tobacco Use    Smoking status: Current Every Day Smoker     Packs/day: 0.50     Years: 10.00     Pack years: 5.00     Types: Cigarettes    Smokeless tobacco: Never Used   Substance Use Topics    Alcohol use: No    Drug use: No       SCREENINGS    [place] Coma Scale  Eye Opening: Spontaneous  Best Verbal Response: Oriented  Best Motor Response: Obeys commands  [place] Coma Scale Score: 15        PHYSICAL EXAM    (up to 7 for level 4, 8 or more for level 5)     ED Triage Vitals [08/15/20 1417]   BP Temp Temp Source Pulse Resp SpO2 Height Weight   (!) 151/79 97.6 °F (36.4 °C) Oral 80 18 100 % 5' 3\" (1.6 m) 112 lb (50.8 kg)       Physical Exam  Vitals signs and nursing note reviewed. Constitutional:       Appearance: She is well-developed. She is not diaphoretic. HENT:      Head: Atraumatic. Nose: Nose normal.   Eyes:      General:         Right eye: No discharge. Left eye: No discharge. Neck:      Musculoskeletal: Normal range of motion. Cardiovascular:      Rate and Rhythm: Normal rate and regular rhythm. Pulses: Normal pulses. Heart sounds: No murmur. No friction rub. No gallop. Comments: 2+ radial and dorsal pedal pulses bilaterally  Pulmonary:      Effort: Pulmonary effort is normal. No respiratory distress. Breath sounds: No stridor. No wheezing, rhonchi or rales. Abdominal:      General: Bowel sounds are normal. There is no distension. Palpations: Abdomen is soft. There is no mass. Tenderness: There is no abdominal tenderness. There is no guarding or rebound. Hernia: No hernia is present. Musculoskeletal: Normal range of motion. General: No swelling. Comments: Equal range of motion bilateral upper and lower extremities. Sensation light touch in bilateral upper and lower extremities intact. Skin:     General: Skin is warm and dry. Findings: No erythema or rash. Neurological:      Mental Status: She is alert and oriented to person, place, and time. Cranial Nerves: No cranial nerve deficit.    Psychiatric:         Behavior: Behavior normal.         DIAGNOSTIC RESULTS   LABS:    Labs Reviewed   CBC WITH AUTO DIFFERENTIAL - Abnormal; Notable for the following components:       Result Value    RBC 3.46 (*)     Hematocrit 34.9 (*)     .6 (*)     MCH 34.9 (*)     RDW 16.2 (*)     All other components within normal limits    Narrative:     Performed at:  [facility] [address], [place], [address] Phone [phone]   BASIC METABOLIC PANEL W/ REFLEX TO MG FOR LOW K - Abnormal; Notable for the following components:    Sodium 135 (*)     GFR Non- 58 (*)     All other components within normal limits    Narrative:     Performed at:  [facility]  [address],  [place], [address] [employer]   Phone [phone]   BRAIN NATRIURETIC PEPTIDE - Abnormal; Notable for the following components:    Pro- (*)     All other components within normal limits    Narrative:     Performed at:  [facility]  [address],  [place], [address] [employer] [employer]   Phone [phone]   TROPONIN    Narrative:     Performed at:  [facility] [address], [address] [employer]   Phone [phone]   PROTIME-INR    Narrative:     Performed at:  [facility]  [address],  [place], [address]   Phone [phone]   TROPONIN    Narrative:     Performed at:  [facility]  [address],  [place], [address] [employer] [employer]   Phone [phone]   TROPONIN    Narrative:     Performed at:  [facility]  [address],  [place], [address] [employer]   Phone [phone]       All other labs were within normal range or not returned as of this dictation. EKG: All EKG's are interpreted by the Emergency Department Physician in the absence of a cardiologist.  Please see their note for interpretation of EKG. RADIOLOGY:   Non-plain film images such as CT, Ultrasound and MRI are read by the radiologist. Plain radiographic images are visualized and preliminarily interpreted by the ED Provider with the below findings:        Interpretation per the Radiologist below, if available at the time of this note:    XR CHEST PORTABLE   Final Result   Stable pulmonary hyperinflation. No acute pulmonary process.            Ct Abdomen Pelvis Wo Contrast Additional Contrast? ever since she has been anxious and unable to eat and having some abdominal pain Abdomen/Pelvis: There is a trace amount of dependent right pleural fluid. A moderate-sized pericardial effusion is present measuring up to 10 mm thickness. No evidence of acute or traumatic process of the visualized lower chest otherwise. Organs: Limited due to lack of contrast.  No acute or traumatic findings the organs throughout the abdomen. GI/Bowel: No acute or traumatic findings involving bowel throughout the abdomen and pelvis. Pelvis: Normal appearance of the bladder. No acute or traumatic findings of the pelvic structures. Peritoneum/Retroperitoneum: No free air. No free fluid or hemoperitoneum. No evidence of acute or traumatic abnormality of the aorta. Bones/Soft Tissues: No fracture throughout the abdomen and pelvis. No hematoma of the body wall. Thoracic and lumbar spine: No fracture of the thoracic spine or the lumbar spine. Normal alignment. No significant degenerative changes in the thoracic spine. There is a large amount of degenerative disc disease change at L2-3 in the lumbar spine. Fusion has been performed of L4 and L5. Moderate degenerative changes at L5-S1. Advanced degenerative changes of the facet joints in the upper lumbar spine as well. Associated central canal spinal stenosis at L2-3 stenosis appears present at other levels obscured by artifact from the fusion hardware     Abdomen/pelvis: Moderate pericardial effusion and trace amount of right pleural fluid. These are new findings from the patient's abdomen/pelvis CT February 4, 2018. Is doubtful that this is due to trauma but of uncertain etiology. No noncontrast CT evidence of acute or traumatic process of the abdomen pelvis. Thoracic and lumbar spine: No acute or traumatic abnormality of the thoracic spine.   No fracture     Ct Head Wo Contrast    Result Date: 8/14/2020  EXAMINATION: CT OF THE HEAD WITHOUT CONTRAST; CT OF THE CERVICAL SPINE again noted most likely due to chronic microvascular ischemia. ORBITS: The visualized portion of the orbits demonstrate no acute abnormality. SINUSES: There is an air-fluid level in the right maxillary sinus, partially imaged, incompletely visualized. SOFT TISSUES/SKULL:  No acute abnormality of the visualized skull or soft tissues. CERVICAL SPINE: No fracture demonstrated throughout the cervical spine or visualized thoracic spine. No prevertebral soft tissue swelling or other acute traumatic findings surrounding the cervical spine. Advanced degenerative disc disease changes are present at C5-6. Mild degenerative changes elsewhere in the cervical spine involving the discs. Advanced left upper cervical spine facet degenerative changes also present. This is most severe at C2-3. Head: No acute intracranial abnormality. There is an air-fluid level of the right maxillary sinus most likely due to sinusitis. Cervical spine: No acute abnormality of the cervical spine. Ct Cervical Spine Wo Contrast    Result Date: 8/14/2020  EXAMINATION: CT OF THE HEAD WITHOUT CONTRAST; CT OF THE CERVICAL SPINE WITHOUT CONTRAST 8/14/2020 6:29 pm TECHNIQUE: CT of the head was performed without the administration of intravenous contrast. Dose modulation, iterative reconstruction, and/or weight based adjustment of the mA/kV was utilized to reduce the radiation dose to as low as reasonably achievable.; CT of the cervical spine was performed without the administration of intravenous contrast. Multiplanar reformatted images are provided for review. Dose modulation, iterative reconstruction, and/or weight based adjustment of the mA/kV was utilized to reduce the radiation dose to as low as reasonably achievable. COMPARISON: Head CT February 4, 2018.   Cervical spine from 2018 is compared as well HISTORY: ORDERING SYSTEM PROVIDED HISTORY: possible head injury/numbness TECHNOLOGIST PROVIDED HISTORY: Reason for exam:->possible head injury/numbness Has a \"code stroke\" or \"stroke alert\" been called? ->No Is the patient pregnant?->No Reason for Exam: Fall (Pt. comes in by [facility] for complaints of a fall. Pt. reports that she tripped over a wire this morning and fell injuring her back. Pt. has chronic back issues. Pt. reports that ever since she has been anxious and unable to eat and having some abdominal pain; ORDERING SYSTEM PROVIDED HISTORY: fall TECHNOLOGIST PROVIDED HISTORY: Reason for exam:->fall Is the patient pregnant?->No Reason for Exam: Fall (Pt. comes in by [facility] for complaints of a fall. Pt. reports that she tripped over a wire this morning and fell injuring her back. Pt. has chronic back issues. Pt. reports that ever since she has been anxious and unable to eat and having some abdominal pain FINDINGS: BRAIN/VENTRICLES: There is no acute intracranial hemorrhage, mass effect or midline shift. No abnormal extra-axial fluid collection. The gray-white differentiation is maintained without evidence of an acute infarct. There is no evidence of hydrocephalus. Chronic periventricular white matter low densities are again noted most likely due to chronic microvascular ischemia. ORBITS: The visualized portion of the orbits demonstrate no acute abnormality. SINUSES: There is an air-fluid level in the right maxillary sinus, partially imaged, incompletely visualized. SOFT TISSUES/SKULL:  No acute abnormality of the visualized skull or soft tissues. CERVICAL SPINE: No fracture demonstrated throughout the cervical spine or visualized thoracic spine. No prevertebral soft tissue swelling or other acute traumatic findings surrounding the cervical spine. Advanced degenerative disc disease changes are present at C5-6. Mild degenerative changes elsewhere in the cervical spine involving the discs. Advanced left upper cervical spine facet degenerative changes also present. This is most severe at C2-3.      Head: No acute intracranial abnormality. There is an air-fluid level of the right maxillary sinus most likely due to sinusitis. Cervical spine: No acute abnormality of the cervical spine. Ct Thoracic Spine Wo Contrast    Result Date: 8/14/2020  EXAMINATION: CT OF THE ABDOMEN AND PELVIS WITHOUT CONTRAST; CT OF THE LUMBAR SPINE WITHOUT CONTRAST; CT OF THE THORACIC SPINE WITHOUT CONTRAST 8/14/2020 6:29 pm TECHNIQUE: CT of the abdomen and pelvis was performed without the administration of intravenous contrast. Multiplanar reformatted images are provided for review. Dose modulation, iterative reconstruction, and/or weight based adjustment of the mA/kV was utilized to reduce the radiation dose to as low as reasonably achievable.; CT of the lumbar spine was performed without the administration of intravenous contrast. Multiplanar reformatted images are provided for review. Dose modulation, iterative reconstruction, and/or weight based adjustment of the mA/kV was utilized to reduce the radiation dose to as low as reasonably achievable.; CT of the thoracic spine was performed without the administration of intravenous contrast. Multiplanar reformatted images are provided for review. Dose modulation, iterative reconstruction, and/or weight based adjustment of the mA/kV was utilized to reduce the radiation dose to as low as reasonably achievable. COMPARISON: None. HISTORY: ORDERING SYSTEM PROVIDED HISTORY: abd pain TECHNOLOGIST PROVIDED HISTORY: Reason for exam:->abd pain Additional Contrast?->None Is the patient pregnant?->No Reason for Exam: Fall (Pt. comes in by [facility] for complaints of a fall. Pt. reports that she tripped over a wire this morning and fell injuring her back. Pt. has chronic back issues.  Pt. reports that ever since she has been anxious and unable to eat and having some abdominal pain; ORDERING SYSTEM PROVIDED HISTORY: fall TECHNOLOGIST PROVIDED HISTORY: Reason for exam:->fall Is the patient pregnant?->No Reason for Exam: Fall (Pt. comes in by [facility] for complaints of a fall. Pt. reports that she tripped over a wire this morning and fell injuring her back. Pt. has chronic back issues. Pt. reports that ever since she has been anxious and unable to eat and having some abdominal pain Abdomen/Pelvis: There is a trace amount of dependent right pleural fluid. A moderate-sized pericardial effusion is present measuring up to 10 mm thickness. No evidence of acute or traumatic process of the visualized lower chest otherwise. Organs: Limited due to lack of contrast.  No acute or traumatic findings the organs throughout the abdomen. GI/Bowel: No acute or traumatic findings involving bowel throughout the abdomen and pelvis. Pelvis: Normal appearance of the bladder. No acute or traumatic findings of the pelvic structures. Peritoneum/Retroperitoneum: No free air. No free fluid or hemoperitoneum. No evidence of acute or traumatic abnormality of the aorta. Bones/Soft Tissues: No fracture throughout the abdomen and pelvis. No hematoma of the body wall. Thoracic and lumbar spine: No fracture of the thoracic spine or the lumbar spine. Normal alignment. No significant degenerative changes in the thoracic spine. There is a large amount of degenerative disc disease change at L2-3 in the lumbar spine. Fusion has been performed of L4 and L5. Moderate degenerative changes at L5-S1. Advanced degenerative changes of the facet joints in the upper lumbar spine as well. Associated central canal spinal stenosis at L2-3 stenosis appears present at other levels obscured by artifact from the fusion hardware     Abdomen/pelvis: Moderate pericardial effusion and trace amount of right pleural fluid. These are new findings from the patient's abdomen/pelvis CT February 4, 2018. Is doubtful that this is due to trauma but of uncertain etiology. No noncontrast CT evidence of acute or traumatic process of the abdomen pelvis.  Thoracic and lumbar spine: No acute or traumatic abnormality of the thoracic spine. No fracture     Ct Lumbar Spine Wo Contrast    Result Date: 8/14/2020  EXAMINATION: CT OF THE ABDOMEN AND PELVIS WITHOUT CONTRAST; CT OF THE LUMBAR SPINE WITHOUT CONTRAST; CT OF THE THORACIC SPINE WITHOUT CONTRAST 8/14/2020 6:29 pm TECHNIQUE: CT of the abdomen and pelvis was performed without the administration of intravenous contrast. Multiplanar reformatted images are provided for review. Dose modulation, iterative reconstruction, and/or weight based adjustment of the mA/kV was utilized to reduce the radiation dose to as low as reasonably achievable.; CT of the lumbar spine was performed without the administration of intravenous contrast. Multiplanar reformatted images are provided for review. Dose modulation, iterative reconstruction, and/or weight based adjustment of the mA/kV was utilized to reduce the radiation dose to as low as reasonably achievable.; CT of the thoracic spine was performed without the administration of intravenous contrast. Multiplanar reformatted images are provided for review. Dose modulation, iterative reconstruction, and/or weight based adjustment of the mA/kV was utilized to reduce the radiation dose to as low as reasonably achievable. COMPARISON: None. HISTORY: ORDERING SYSTEM PROVIDED HISTORY: abd pain TECHNOLOGIST PROVIDED HISTORY: Reason for exam:->abd pain Additional Contrast?->None Is the patient pregnant?->No Reason for Exam: Fall (Pt. comes in by [facility] for complaints of a fall. Pt. reports that she tripped over a wire this morning and fell injuring her back. Pt. has chronic back issues. Pt. reports that ever since she has been anxious and unable to eat and having some abdominal pain; ORDERING SYSTEM PROVIDED HISTORY: fall TECHNOLOGIST PROVIDED HISTORY: Reason for exam:->fall Is the patient pregnant?->No Reason for Exam: Fall (Pt. comes in by [facility] for complaints of a fall.  Pt. reports that she tripped over a wire this morning and fell injuring her back. Pt. has chronic back issues. Pt. reports that ever since she has been anxious and unable to eat and having some abdominal pain Abdomen/Pelvis: There is a trace amount of dependent right pleural fluid. A moderate-sized pericardial effusion is present measuring up to 10 mm thickness. No evidence of acute or traumatic process of the visualized lower chest otherwise. Organs: Limited due to lack of contrast.  No acute or traumatic findings the organs throughout the abdomen. GI/Bowel: No acute or traumatic findings involving bowel throughout the abdomen and pelvis. Pelvis: Normal appearance of the bladder. No acute or traumatic findings of the pelvic structures. Peritoneum/Retroperitoneum: No free air. No free fluid or hemoperitoneum. No evidence of acute or traumatic abnormality of the aorta. Bones/Soft Tissues: No fracture throughout the abdomen and pelvis. No hematoma of the body wall. Thoracic and lumbar spine: No fracture of the thoracic spine or the lumbar spine. Normal alignment. No significant degenerative changes in the thoracic spine. There is a large amount of degenerative disc disease change at L2-3 in the lumbar spine. Fusion has been performed of L4 and L5. Moderate degenerative changes at L5-S1. Advanced degenerative changes of the facet joints in the upper lumbar spine as well. Associated central canal spinal stenosis at L2-3 stenosis appears present at other levels obscured by artifact from the fusion hardware     Abdomen/pelvis: Moderate pericardial effusion and trace amount of right pleural fluid. These are new findings from the patient's abdomen/pelvis CT February 4, 2018. Is doubtful that this is due to trauma but of uncertain etiology. No noncontrast CT evidence of acute or traumatic process of the abdomen pelvis. Thoracic and lumbar spine: No acute or traumatic abnormality of the thoracic spine.   No fracture     Xr Chest Portable    Result HISTORY: ORDERING SYSTEM PROVIDED HISTORY: abdominal pain, hx of celiac artery stenosis TECHNOLOGIST PROVIDED HISTORY: Reason for exam:->abdominal pain, hx of celiac artery stenosis Reason for Exam: abdominal pain, hx of celiac artery stenosis Acuity: Acute Type of Exam: Initial FINDINGS: Vascular: Normal caliber included thoracic aorta without evidence of aneurysm or dissection. Normal caliber abdominal aorta without evidence of aneurysm, dissection or penetrating atherosclerotic ulcer. Widely patent visceral arterial branch vessels, with mild extrinsic narrowing of the proximal celiac artery. There seems to be a focal mixed calcified plaque at the celiac artery origin on the curved multiplanar reformatted views, subtly appreciated on other views, resulting in moderate narrowing. A portion of the proximal common iliac arteries and very distal abdominal aorta is obscured by streak artifact from lumbar fusion hardware. Included iliofemoral systems are patent, with the distal most portions obscured by motion. Nonvascular: LOWER CHEST Lung bases: Ground-glass opacities and atelectasis at the lung bases. Normal heart size with small pericardial effusion. ABDOMEN Liver: Normal liver size, contour and parenchymal attenuation. Gallbladder: Normal. Biliary: No intra or extrahepatic bile duct dilatation. Pancreas: Normal. Spleen: Normal. Adrenals: Normal. Kidneys: Kidneys are symmetric in size and parenchymal enhancement. No hydronephrosis or nephrolithiasis. GI Tract: Normal distal esophagus, stomach and duodenal sweep. No apparent bowel wall thickening or obstruction. Appendix is not identified, however there is no pericecal inflammatory stranding or fluid. Large colonic stool volume. Peritoneum/Retroperitoneum: No enlarged lymph nodes, free air or free fluid. PELVIS Genitourinary: Normal urinary bladder. Status post hysterectomy. Other: No free fluid. No enlarged lymph nodes.  MUSCULOSKELETAL Bones and Soft Tissues: No acute soft tissue or osseous abnormality. Status post L4-L5 discectomy and posterior fusion. No evidence of hardware complication. Upper lumbar spondylosis most pronounced at L2-L3 where there is advanced disc height loss, mild canal and foraminal narrowing. Right facet arthrosis appreciated at this level. No evidence of an acute aortic syndrome. Extrinsic narrowing of the proximal celiac artery resulting in mild narrowing, however suspect a mixed calcified plaque at the celiac artery origin (best appreciated on curved multiplanar reformatted views), which seems to result in at least moderate luminal narrowing at the origin. The former features may be seen with median arcuate ligament syndrome, perhaps intensified by atherosclerotic disease. No acute nonvascular findings in the abdomen or pelvis. Large colonic stool volume without obstruction. Query constipation. Small pericardial effusion. The appearance of ground-glass opacities at the lung bases likely reflects volume averaging of subsegmental atelectasis is opposed to areas of true airspace disease. Correlate for any respiratory symptoms.            PROCEDURES   Unless otherwise noted below, none     Procedures    CRITICAL CARE TIME   N/A    CONSULTS:  IP CONSULT TO HOSPITALIST      EMERGENCY DEPARTMENT COURSE and DIFFERENTIAL DIAGNOSIS/MDM:   Vitals:    Vitals:    08/16/20 0206 08/16/20 0355 08/16/20 0358 08/16/20 0559   BP:  117/73     Pulse: 56 52 (!) 47 51   Resp:  16     Temp:  98 °F (36.7 °C)     TempSrc:  Oral     SpO2:  97%     Weight:       Height:           Patient was given the following medications:  Medications   clopidogrel (PLAVIX) tablet 75 mg (75 mg Oral Given 8/15/20 1840)   gabapentin (NEURONTIN) capsule 800 mg (800 mg Oral Given 8/16/20 0508)   metoprolol succinate (TOPROL XL) extended release tablet 50 mg (50 mg Oral Given 8/15/20 1840)   pantoprazole (PROTONIX) tablet 40 mg (40 mg Oral Given 8/16/20 0508)   sodium chloride flush 0.9 % injection 10 mL (10 mLs Intravenous Given 8/15/20 1929)   sodium chloride flush 0.9 % injection 10 mL (has no administration in time range)   acetaminophen (TYLENOL) tablet 650 mg (has no administration in time range)     Or   acetaminophen (TYLENOL) suppository 650 mg (has no administration in time range)   polyethylene glycol (GLYCOLAX) packet 17 g (has no administration in time range)   promethazine (PHENERGAN) tablet 12.5 mg ( Oral See Alternative 8/15/20 1841)     Or   ondansetron (ZOFRAN) injection 4 mg (4 mg Intravenous Given 8/15/20 1841)   enoxaparin (LOVENOX) injection 40 mg (40 mg Subcutaneous Given 8/15/20 1842)   ketorolac (TORADOL) injection 15 mg (15 mg Intravenous Given 8/16/20 0200)   morphine (PF) injection 2 mg (2 mg Intravenous Given 8/16/20 0333)   morphine injection 4 mg (4 mg Intravenous Given 8/15/20 1507)   haloperidol lactate (HALDOL) injection 5 mg (5 mg Intravenous Given 8/15/20 1530)   diphenhydrAMINE (BENADRYL) injection 25 mg (25 mg Intravenous Given 8/15/20 1530)   ketorolac (TORADOL) injection 30 mg (30 mg Intravenous Given 8/15/20 1530)           Patient presented with worsening chest tightness after she signed out AMA yesterday. Patient sustained a fall when she tripped backwards on a cord falling onto her buttocks and had multiple images 1 revealing a moderate pericardial effusion. She was scheduled to have echocardiogram but signed out [address] She has chronic pain. She only fell onto her buttocks and the tingling that she is experiencing in her arm does not fit with the injury to her back. Low suspicion for any type of cord injury, cauda equina, vertebral fracture or acute traumatic etiology. She is mostly here worried about her pericardial effusion. This most likely is chronic and slowly accumulated over time but given that she is having chest discomfort will discuss this with the hospitalist and she was already admitted last night.   Low suspicion for acute coronary syndrome, pericarditis, myocarditis, aortic dissection, esophageal rupture, pericardial tamponade or other emergent etiology. Still waiting hospitalist consult at the end of my shift. Attending will discussed this with hospitalist for admission. FINAL IMPRESSION      1. Chest pain, unspecified type    2.  Pericardial effusion          DISPOSITION/PLAN   DISPOSITION Admitted 08/15/2020 04:25:54 PM      PATIENT REFERREDTO:  Ruba Smith DO  [address] [place] [address]  [address] [place] [address] 20-23-41-52            DISCHARGE MEDICATIONS:  Current Discharge Medication List          DISCONTINUED MEDICATIONS:  Current Discharge Medication List                 (Please note that portions of this note were completed with a voice recognition program.  Efforts were made to edit the dictations but occasionally words are mis-transcribed.)    Darien King PA-C (electronically signed)           Darien King PA-C  08/16/20 0207

## 2020-08-15 NOTE — ED NOTES
PT bed alarm alarming, went into room, PT trying to get out of the foot of the bed while still attached to the monitors and PIV fluids. I let PT know she can not get out of the bed without assistance. PT unhooked from alarms, fluids and pumps moved so PT could go to restroom.   Pt does not want cardiac monitor on or bed alarm or side rails up , pt reeducated, on reason by FLORIAN-Edy,  at [address] [facility], RN  08/14/20 2027       Chastity Gomez RN  08/14/20 2110       Chastity Gomez RN  08/14/20 7083

## 2020-08-15 NOTE — ED NOTES
ROWAN askew called RN, pt needs CT prior to transfer to floor, RN told pa that toradol does not work for her, RN told Vivi Bingham that morphine did not work for her pain earlier either, RN told PA pt was upset earlier and [id number]  08/14/20 3372

## 2020-08-15 NOTE — ED NOTES
jamilah bernstein called dt pt anxius and unable to calm for ct, pt sitting up in bed and trying to get oob     Cecilio Pinto RN  08/14/20 5899

## 2020-08-15 NOTE — ED NOTES
This RN in to check on pt. Eyes closed and resting easy. Awakened upon entering. Reports her pain an 8/10; however, she states she does feel a \"little better. \" Will continue to monitor.  Meal tray requested per pt request.     Shari Davidson RN  08/15/20 6701

## 2020-08-15 NOTE — ED NOTES
Pt found in hallway asking for RN to leave hospital, pt upset and asking for RN, RN walked pt back to room and redirected pt to use call light,   RN put battery back in bed alarm box, put side rails upx2, and told pt she needs to be placed on monitors, pt told RN she does not want to be on monitors , urine sent to lab  Lucas Tate, FLORIAN  08/14/20 [address] [facility] [address], RN  08/14/20 [address] [facility] [address]anda, FLORIAN  08/14/20 0728

## 2020-08-15 NOTE — ED PROVIDER NOTES
As physician-in-triage, I performed a medical screening history and physical exam on Janeth Cabezas. I also cared for and evaluated the patient in conjunction with the ED Advanced Practice Provider. All diagnostic, treatment, and disposition decisions were made by myself in conjunction with the advanced practice provider. For all further details of the patient's emergency department visit, please see the advanced practice provider's documentation. Patient presents to the ER for evaluation of acute on chronic pain syndrome recent mechanical fall paresthesias of the arm no pulse deficit, persistent chest pain positive pericardial effusion she left AMA, she will be admitted for echocardiogram clinically she does not have evidence of acute tamponade she has no signs of acute coronary syndrome this point time no clinical concern for dissection. She received analgesia antiemetic therapy anxiolysis, she will be admitted to the hospital for further medical management. Audiology consultation echocardiogram.      Impression: Acute precordial chest pain, pericardial effusion. Musculoskeletal pain multiple sites.       Wilford Bence, MD  23/44/76 6014 Benny Mosqueda MD  59/35/89 6855

## 2020-08-16 LAB
BILIRUBIN URINE: NEGATIVE
BLOOD, URINE: ABNORMAL
CLARITY: CLEAR
COLOR: YELLOW
EKG ATRIAL RATE: 74 BPM
EKG DIAGNOSIS: NORMAL
EKG P AXIS: 45 DEGREES
EKG P-R INTERVAL: 180 MS
EKG Q-T INTERVAL: 408 MS
EKG QRS DURATION: 92 MS
EKG QTC CALCULATION (BAZETT): 452 MS
EKG R AXIS: 52 DEGREES
EKG T AXIS: 56 DEGREES
EKG VENTRICULAR RATE: 74 BPM
EPITHELIAL CELLS, UA: 2 /HPF (ref 0–5)
GLUCOSE URINE: NEGATIVE MG/DL
HYALINE CASTS: 0 /LPF (ref 0–8)
KETONES, URINE: NEGATIVE MG/DL
LEUKOCYTE ESTERASE, URINE: NEGATIVE
MICROSCOPIC EXAMINATION: YES
NITRITE, URINE: NEGATIVE
PH UA: 5.5 (ref 5–8)
PROTEIN UA: NEGATIVE MG/DL
RBC UA: 6 /HPF (ref 0–4)
SPECIFIC GRAVITY UA: >1.03 (ref 1–1.03)
URINE REFLEX TO CULTURE: ABNORMAL
URINE TYPE: ABNORMAL
UROBILINOGEN, URINE: 0.2 E.U./DL
WBC UA: 2 /HPF (ref 0–5)

## 2020-08-16 PROCEDURE — 2580000003 HC RX 258: Performed by: FAMILY MEDICINE

## 2020-08-16 PROCEDURE — 6360000002 HC RX W HCPCS: Performed by: FAMILY MEDICINE

## 2020-08-16 PROCEDURE — 6370000000 HC RX 637 (ALT 250 FOR IP): Performed by: FAMILY MEDICINE

## 2020-08-16 PROCEDURE — 99204 OFFICE O/P NEW MOD 45 MIN: CPT | Performed by: INTERNAL MEDICINE

## 2020-08-16 PROCEDURE — 96376 TX/PRO/DX INJ SAME DRUG ADON: CPT

## 2020-08-16 PROCEDURE — 93010 ELECTROCARDIOGRAM REPORT: CPT | Performed by: INTERNAL MEDICINE

## 2020-08-16 PROCEDURE — G0378 HOSPITAL OBSERVATION PER HR: HCPCS

## 2020-08-16 PROCEDURE — 6370000000 HC RX 637 (ALT 250 FOR IP): Performed by: NURSE PRACTITIONER

## 2020-08-16 PROCEDURE — 81001 URINALYSIS AUTO W/SCOPE: CPT

## 2020-08-16 RX ORDER — MAG HYDROX/ALUMINUM HYD/SIMETH 400-400-40
1 SUSPENSION, ORAL (FINAL DOSE FORM) ORAL ONCE
Status: DISCONTINUED | OUTPATIENT
Start: 2020-08-16 | End: 2020-08-19 | Stop reason: HOSPADM

## 2020-08-16 RX ORDER — NICOTINE 21 MG/24HR
1 PATCH, TRANSDERMAL 24 HOURS TRANSDERMAL DAILY
Status: DISCONTINUED | OUTPATIENT
Start: 2020-08-16 | End: 2020-08-19 | Stop reason: HOSPADM

## 2020-08-16 RX ORDER — LIDOCAINE 4 G/G
1 PATCH TOPICAL DAILY
Status: DISCONTINUED | OUTPATIENT
Start: 2020-08-16 | End: 2020-08-19 | Stop reason: HOSPADM

## 2020-08-16 RX ORDER — LEVOTHYROXINE SODIUM 0.03 MG/1
50 TABLET ORAL DAILY
Status: DISCONTINUED | OUTPATIENT
Start: 2020-08-16 | End: 2020-08-19 | Stop reason: HOSPADM

## 2020-08-16 RX ADMIN — PROMETHAZINE HYDROCHLORIDE 12.5 MG: 25 TABLET ORAL at 23:13

## 2020-08-16 RX ADMIN — GABAPENTIN 800 MG: 400 CAPSULE ORAL at 05:08

## 2020-08-16 RX ADMIN — GABAPENTIN 800 MG: 400 CAPSULE ORAL at 22:08

## 2020-08-16 RX ADMIN — KETOROLAC TROMETHAMINE 15 MG: 30 INJECTION, SOLUTION INTRAMUSCULAR at 02:00

## 2020-08-16 RX ADMIN — MORPHINE SULFATE 2 MG: 2 INJECTION, SOLUTION INTRAMUSCULAR; INTRAVENOUS at 16:02

## 2020-08-16 RX ADMIN — GABAPENTIN 800 MG: 400 CAPSULE ORAL at 14:08

## 2020-08-16 RX ADMIN — Medication 10 ML: at 22:08

## 2020-08-16 RX ADMIN — PANTOPRAZOLE SODIUM 40 MG: 40 TABLET, DELAYED RELEASE ORAL at 05:08

## 2020-08-16 RX ADMIN — KETOROLAC TROMETHAMINE 15 MG: 30 INJECTION, SOLUTION INTRAMUSCULAR at 09:58

## 2020-08-16 RX ADMIN — CLOPIDOGREL 75 MG: 75 TABLET, FILM COATED ORAL at 09:58

## 2020-08-16 RX ADMIN — LEVOTHYROXINE SODIUM 50 MCG: 0.03 TABLET ORAL at 17:10

## 2020-08-16 RX ADMIN — MORPHINE SULFATE 2 MG: 2 INJECTION, SOLUTION INTRAMUSCULAR; INTRAVENOUS at 20:32

## 2020-08-16 RX ADMIN — ACETAMINOPHEN 650 MG: 325 TABLET, FILM COATED ORAL at 12:30

## 2020-08-16 RX ADMIN — MORPHINE SULFATE 2 MG: 2 INJECTION, SOLUTION INTRAMUSCULAR; INTRAVENOUS at 03:33

## 2020-08-16 RX ADMIN — KETOROLAC TROMETHAMINE 15 MG: 30 INJECTION, SOLUTION INTRAMUSCULAR at 17:19

## 2020-08-16 RX ADMIN — ONDANSETRON 4 MG: 2 INJECTION INTRAMUSCULAR; INTRAVENOUS at 16:02

## 2020-08-16 RX ADMIN — Medication 10 ML: at 09:59

## 2020-08-16 RX ADMIN — ONDANSETRON 4 MG: 2 INJECTION INTRAMUSCULAR; INTRAVENOUS at 22:07

## 2020-08-16 RX ADMIN — PROMETHAZINE HYDROCHLORIDE 12.5 MG: 25 TABLET ORAL at 17:10

## 2020-08-16 RX ADMIN — Medication 10 ML: at 20:32

## 2020-08-16 ASSESSMENT — PAIN SCALES - GENERAL
PAINLEVEL_OUTOF10: 9
PAINLEVEL_OUTOF10: 8
PAINLEVEL_OUTOF10: 7
PAINLEVEL_OUTOF10: 10
PAINLEVEL_OUTOF10: 9
PAINLEVEL_OUTOF10: 0
PAINLEVEL_OUTOF10: 10
PAINLEVEL_OUTOF10: 8
PAINLEVEL_OUTOF10: 8
PAINLEVEL_OUTOF10: 9

## 2020-08-16 NOTE — PROGRESS NOTES
Pt awake in bed. HR low and NP paged. Pt still wanting morphine despite being told she cant have it for low HR. Pt is asymptomatic, assessment complete and call light in reach.  at bedside.

## 2020-08-16 NOTE — PROGRESS NOTES
Pt awake in bed. PRN zofran and morphine given for pain. Pt denies any other needs. Call light in reach.

## 2020-08-16 NOTE — PROGRESS NOTES
(36.7 °C) (Oral)   Resp 16   Ht 5' 3\" (1.6 m)   Wt 119 lb (54 kg)   SpO2 97%   BMI 21.08 kg/m²   No intake or output data in the 24 hours ending 08/16/20 0653   Wt Readings from Last 3 Encounters:   08/15/20 119 lb (54 kg)   08/14/20 112 lb (50.8 kg)   12/29/17 116 lb 1.6 oz (52.7 kg)       General appearance:  Appears comfortable  Eyes: Sclera clear. Pupils equal.  ENT: Moist oral mucosa. Trachea midline, no adenopathy. Cardiovascular: Regular rhythm, normal S1, S2. No murmur. No edema in lower extremities  Respiratory: Not using accessory muscles. Good inspiratory effort. Clear to auscultation bilaterally, no wheeze or crackles. GI: Abdomen soft, diffuse tenderness, not distended, normal bowel sounds  Musculoskeletal: Lumbar paraspinal tenderness  Neurology: CN 2-12 grossly intact. No speech or motor deficits  Psych: Normal affect. Alert and oriented in time, place and person  Skin: Warm, dry, normal turgor    Labs and Tests:  CBC:   Recent Labs     08/14/20  1713 08/15/20  1429   WBC 6.4 8.0   HGB 12.4 12.1    228     BMP:    Recent Labs     08/14/20  1713 08/15/20  1429   * 135*   K 4.7 3.7   CL 96* 100   CO2 23 24   BUN 13 19   CREATININE 0.8 1.0   GLUCOSE 93 89     Hepatic:   Recent Labs     08/14/20  1713   AST 31   ALT 6*   BILITOT 0.4   ALKPHOS 61       CXR 8/15/2020:  FINDINGS:    Single view provided.  Stable mediastinal and cardiac silhouettes.  Stable    right vascular stent.  Stable pulmonary hyperinflation with no acute or    progressive consolidation.  No effusion or pneumothorax.  No free    subdiaphragmatic air.  The bowel gas pattern is normal.              Impression    Stable pulmonary hyperinflation.  No acute pulmonary process. CT Abd/Pelvis 8/14/2020:   Abdomen/pelvis: Moderate pericardial effusion and trace amount of right    pleural fluid.  These are new findings from the patient's abdomen/pelvis CT    February 4, 2018.  Is doubtful that this is due to trauma but of showed advanced degenerative changes in lumbar spine but no acute fracture. Add lidocaine patch. May also use heat or ice to back. 4. Hypothyroidism. S/p thyroid radiation ablation 2018. Has not followed with endocrinology or levothyroxine. TSH 44, Free T4 0.2. Begin levothyroxine 50 mcg daily. 5. Urinary frequency. Patient afebrile. No leukocytosis. Check UA. CBC in am.   6. HTN. Controlled. Continue metoprolol. BMP in am.   7.  Tobacco use. Encouraged cessation. Nicotine patch.      Diet: DIET RENAL;  Code:Full Code  DVT PPX: on enoxaparin      STEPHANIE Naylor - CNP   8/16/2020 12:37 PM

## 2020-08-16 NOTE — CONSULTS
[facility]  [phone]      Chief Complaint   Patient presents with    Chest Pain     IN VIA [facility] FROM HOME. SEEN YESTERAY FOR ABD PAIN. ALSO STATES TINGLING AND NUMBNESS IN ALL EXTREMETIES. PT STATES SHE SIGNED OUT AMA YESTERDAY. History of Present Illness:  Kenney Rice is a 47 y.o. patient who presented to the hospital with complaints of abdominal pain. She has multiple complaints of back pain, abdominal pain, neck pain, chest pain. Pain is improved with morphine. h/o PAF in 2018. Yes family history of heart disease. I have been asked to provide consultation regarding further management and testing. Normal nuc stress and echo in 2017 and 2019    Past Medical History:   has a past medical history of Candida esophagitis ([address] Celiac artery stenosis ([address] Childhood asthma, Chronic midline low back pain without sciatica, Chronic migraine w/o aura w/o status migrainosus, not intractable, Coronary artery disease involving native coronary artery of native heart without angina pectoris, Duodenal stenosis, Essential hypertension, Hiatal hernia, Hypercholesterolemia, Hyperthyroidism, Movement disorder, PUD (peptic ulcer disease), Subclavian artery stenosis ([address] and Tobacco abuse. Surgical History:   has a past surgical history that includes lumbar laminectomy (2004 / 2008); Hysterectomy (1994); Ovarian cyst surgery (1992); laparoscopy (2071); laparotomy (1427); [place] tooth extraction; back surgery; Colonoscopy; Cardiac surgery; Upper gastrointestinal endoscopy (6/1/15); Upper gastrointestinal endoscopy (7/28/15); Upper gastrointestinal endoscopy (4/25/16); and vascular surgery (01/2015). Social History:   reports that she has been smoking cigarettes. She has a 5.00 pack-year smoking history. She has never used smokeless tobacco. She reports that she does not drink alcohol or use drugs.      Family History:  family history includes Asthma in her daughter and son; Cancer in her mother; Diabetes in her paternal aunt; Heart Disease in her father; High Blood Pressure in her father. Home Medications:  Were reviewed and are listed in nursing record. and/or listed below  Prior to Admission medications    Medication Sig Start Date End Date Taking? Authorizing Provider   pantoprazole (PROTONIX) 40 MG tablet TAKE 1 TABLET BY MOUTH DAILY 4/20/18  Yes Martin Sawyer,    ondansetron (ZOFRAN ODT) 4 MG disintegrating tablet Take 1-2 tablets by mouth every 8 hours as needed for Nausea May substitute the non ODT tablets if not covered financially by the insurance plan. 2/4/18  Yes Ondina Archuleta MD   gabapentin (NEURONTIN) 600 MG tablet  1/8/18  Yes Historical Provider, MD   metoprolol succinate (TOPROL XL) 50 MG extended release tablet Take 50 mg by mouth daily   Yes Historical Provider, MD   promethazine (PHENERGAN) 25 MG tablet Take 25 mg by mouth every 6 hours as needed for Nausea (Nausea due to migraines)   Yes Historical Provider, MD   clopidogrel (PLAVIX) 75 MG tablet Take 1 tablet by mouth daily 7/6/17  Yes Juan Carlos Blue DO   SUMAtriptan (IMITREX) 100 MG tablet TAKE ONE BY MOUTH AT ONSET OF MIGRAINE. MAY REPEAT ONCE IN 2 HRS.  MAX OF 2 PER DAY. 7/6/17  Yes Elvia Cantrell DO        Current Medications:  Current Facility-Administered Medications   Medication Dose Route Frequency Provider Last Rate Last Dose    clopidogrel (PLAVIX) tablet 75 mg  75 mg Oral Daily Loraine Glover MD   75 mg at 08/16/20 0958    gabapentin (NEURONTIN) capsule 800 mg  800 mg Oral TID Elba Coats MD   800 mg at 08/16/20 0508    metoprolol succinate (TOPROL XL) extended release tablet 50 mg  50 mg Oral Daily Loraine Glover MD   50 mg at 08/15/20 1840    pantoprazole (PROTONIX) tablet 40 mg  40 mg Oral QAM AC Loraine Mandujano MD   40 mg at 08/16/20 0508    sodium chloride flush 0.9 % injection 10 mL  10 mL Intravenous 2 times per day Elba Coats MD   10 mL at 08/16/20 7335  sodium chloride flush 0.9 % injection 10 mL  10 mL Intravenous PRN Pedro Barrios MD        acetaminophen (TYLENOL) tablet 650 mg  650 mg Oral Q6H PRN Pedro Barrios MD        Or   Gary Cantu acetaminophen (TYLENOL) suppository 650 mg  650 mg Rectal Q6H PRN Pedro Barrios MD        polyethylene glycol (GLYCOLAX) packet 17 g  17 g Oral Daily PRN Pedro Barrios MD        promethazine (PHENERGAN) tablet 12.5 mg  12.5 mg Oral Q6H PRN Pedro Barrios MD        Or    ondansetron (ZOFRAN) injection 4 mg  4 mg Intravenous Q6H PRN Pedro Barrios MD   4 mg at 08/15/20 1841    enoxaparin (LOVENOX) injection 40 mg  40 mg Subcutaneous Daily Pedro Barrios MD   Stopped at 08/16/20 0853    ketorolac (TORADOL) injection 15 mg  15 mg Intravenous Q6H PRN Pedro Barrios MD   15 mg at 08/16/20 0958    morphine (PF) injection 2 mg  2 mg Intravenous Q4H PRN Danilo Curry MD   2 mg at 08/16/20 1084        Allergies:  Iodine and Penicillins     Review of Systems:     · Constitutional: there has been no unanticipated weight loss. There's been no change in energy level, sleep pattern, or activity level. · Eyes: No visual changes or diplopia. No scleral icterus. · ENT: No Headaches, hearing loss or vertigo. No mouth sores or sore throat. · Cardiovascular: Reviewed in HPI  · Respiratory: No cough or wheezing, no sputum production. No hematemesis. · Gastrointestinal: No abdominal pain, appetite loss, blood in stools. No change in bowel or bladder habits. · Genitourinary: No dysuria, trouble voiding, or hematuria. · Musculoskeletal:  No gait disturbance, weakness or joint complaints. · Integumentary: No rash or pruritis. · Neurological: No headache, diplopia, change in muscle strength, numbness or tingling. No change in gait, balance, coordination, mood, affect, memory, mentation, behavior. · Psychiatric: No anxiety, no depression. · Endocrine: No malaise, fatigue or temperature intolerance.  No excessive thirst, fluid intake, or urination. No tremor. · Hematologic/Lymphatic: No abnormal bruising or bleeding, blood clots or swollen lymph nodes. · Allergic/Immunologic: No nasal congestion or hives.   ·     Physical Examination:    Vitals:    08/16/20 1213   BP: 130/82   Pulse: 55   Resp: 16   Temp: 98 °F (36.7 °C)   SpO2: 98%    Weight: 119 lb (54 kg)         General Appearance:  Alert, cooperative, no distress, appears stated age   Head:  Normocephalic, without obvious abnormality, atraumatic   Eyes:  PERRL, conjunctiva/corneas clear       Nose: Nares normal, no drainage or sinus tenderness   Throat: Lips, mucosa, and tongue normal   Neck: Supple, symmetrical, trachea midline, no adenopathy, thyroid: not enlarged, symmetric, no tenderness/mass/nodules, no carotid bruit or JVD       Lungs:   Clear to auscultation bilaterally, respirations unlabored   Chest Wall:  No tenderness or deformity   Heart:  Regular rate and rhythm, S1, S2 normal, no murmur, rub or gallop   Abdomen:   Soft, non-tender, bowel sounds active all four quadrants,  no masses, no organomegaly           Extremities: Extremities normal, atraumatic, no cyanosis or edema   Pulses: 2+ and symmetric   Skin: Skin color, texture, turgor normal, no rashes or lesions   Pysch: Normal mood and affect   Neurologic: Normal gross motor and sensory exam.         Labs  CBC:   Lab Results   Component Value Date    WBC 8.0 08/15/2020    RBC 3.46 08/15/2020    HGB 12.1 08/15/2020    HCT 34.9 08/15/2020    .6 08/15/2020    RDW 16.2 08/15/2020     08/15/2020     CMP:    Lab Results   Component Value Date     08/15/2020    K 3.7 08/15/2020     08/15/2020    CO2 24 08/15/2020    BUN 19 08/15/2020    CREATININE 1.0 08/15/2020    GFRAA >60 08/15/2020    GFRAA >60 12/11/2009    AGRATIO 1.4 08/14/2020    LABGLOM 58 08/15/2020    GLUCOSE 89 08/15/2020    PROT 8.7 08/14/2020    PROT 7.7 12/08/2009    CALCIUM 10.2 08/15/2020    BILITOT 0.4 08/14/2020    ALKPHOS 61 08/14/2020 AST 31 08/14/2020    ALT 6 08/14/2020     PT/INR:  No results found for: PTINR  Lab Results   Component Value Date    TROPONINI <0.01 08/15/2020       EKG:  I have reviewed EKG with the following interpretation:  Impression:  Normal sinus rhythmNormal     Pt has CAD with following history:  CABG: (date/details),   Valve replacement (date/details)   GXT/Myoview/Lexiscan: (2017)  (results)   Small sized anterior fixed defect of mild intensity consistent with breast     attenuation artifact.     Global wall motion is normal.     There is no evidence of myocardial ischemia or scar.     Normal LV size and systolic function.     Left ventricular ejection fraction of 78 %. 2019  1. Normal SPECT stress and rest myocardial perfusion scan with no evidence of pharmacological stress-induced reversible myocardial ischemia. 2. Normal appearing left ventricular wall motion analysis and ejection fraction at rest.  Stress/echo: (date) (result)   CATH/PCI:  (date)- (results)  - (# and type of stents) -   ECHO: (2017) results EF    %. Normal left ventricle size, wall thickness and systolic function with an   estimated ejection fraction of 55%. No regional wall motion abnormalities are seen. Normal left ventricular diastolic filling pressure. 2019  1. Left ventricle: The cavity size was normal. There was mild     concentric hypertrophy. Systolic function was normal. The     estimated ejection fraction was in the range of 60% to 65%. Wall     motion was normal; there were no regional wall motion     abnormalities. Doppler parameters are consistent with abnormal     left ventricular relaxation (grade 1 diastolic dysfunction). 2. Mitral valve: The annulus was mildly calcified. 3. Right ventricle: The cavity size was normal. Wall thickness was     normal. Systolic function was normal.  4. Pulmonary arteries: Estimated PA peak pressure is 16mm Hg (S). 5. Trivial pericardial effusion with no evidence of tamponade.   DIMPLE (date) (results)  EP procedures/studies/ablation:  (specific data). Device information:  Event monitor: (date/results)    All testing and labs listed below were personally reviewed. Assessment  Patient Active Problem List   Diagnosis    Tobacco abuse    Childhood asthma    Subclavian artery stenosis (HCC)    PUD (peptic ulcer disease)    Duodenal stenosis    Atrial tachycardia, multifocal (HCC)    Atrial flutter (HCC)    Essential hypertension    Chronic migraine w/o aura w/o status migrainosus, not intractable    Celiac artery stenosis (HCC)    Hypercholesterolemia    Epigastric abdominal pain    Hypokalemia    Constipation    Movement disorder    Chronic midline low back pain without sciatica    Coronary artery disease involving native coronary artery of native heart without angina pectoris    Chest pain    Hyperthyroidism    Candida esophagitis ([address]    Hiatal hernia    Pericardial effusion         Plan:    I had the opportunity to review the clinical symptoms and presentation of Arvell Ly. Assessment/Plan:  Active Problems:    Chest pain  Plan: Non cardiac chest pain. Small pericardial effusion seen on CT scan. chronic trace pericardial effusion seen on echo 1 year ago. No signs or symptoms of pericarditis. Can take NSAIDs for pain relief. No echo necessary. Patient has had multiple stress test and echoes at separate hospitals for noncardiac chest pain. All results are normal.    Abd pain: h/o PUD and celiac artery stenosis. Not severe on CTA. Consider vascular consult      Will sign off. Call with questions. I will address the patient's cardiac risk factors and adjusted pharmacologic treatment as needed. In addition, I have reinforced the need for patient directed risk factor modification. Tobacco use was discussed with the patient and educated on the negative effects. I have asked the patient to not utilize these agents.     Thank you for allowing to us to participate in the care or Ange Aponte. Further evaluation will be based upon the patient's clinical course and testing results. All questions and concerns were addressed to the patient/family. Alternatives to my treatment were discussed. The note was completed using EMR. Every effort was made to ensure accuracy; however, inadvertent computerized transcription errors may be present.     Tessie Michaels MD 8/16/2020 12:27 PM

## 2020-08-17 LAB
ANION GAP SERPL CALCULATED.3IONS-SCNC: 15 MMOL/L (ref 3–16)
BUN BLDV-MCNC: 18 MG/DL (ref 7–20)
CALCIUM SERPL-MCNC: 9.8 MG/DL (ref 8.3–10.6)
CHLORIDE BLD-SCNC: 97 MMOL/L (ref 99–110)
CO2: 21 MMOL/L (ref 21–32)
CREAT SERPL-MCNC: 0.7 MG/DL (ref 0.6–1.1)
GFR AFRICAN AMERICAN: >60
GFR NON-AFRICAN AMERICAN: >60
GLUCOSE BLD-MCNC: 102 MG/DL (ref 70–99)
HCT VFR BLD CALC: 38.2 % (ref 36–48)
HEMOGLOBIN: 13.1 G/DL (ref 12–16)
MCH RBC QN AUTO: 34.6 PG (ref 26–34)
MCHC RBC AUTO-ENTMCNC: 34.2 G/DL (ref 31–36)
MCV RBC AUTO: 101.1 FL (ref 80–100)
PDW BLD-RTO: 15.8 % (ref 12.4–15.4)
PLATELET # BLD: 221 K/UL (ref 135–450)
PMV BLD AUTO: 8.2 FL (ref 5–10.5)
POTASSIUM SERPL-SCNC: 3.3 MMOL/L (ref 3.5–5.1)
RBC # BLD: 3.78 M/UL (ref 4–5.2)
SODIUM BLD-SCNC: 133 MMOL/L (ref 136–145)
WBC # BLD: 5.7 K/UL (ref 4–11)

## 2020-08-17 PROCEDURE — APPSS60 APP SPLIT SHARED TIME 46-60 MINUTES: Performed by: NURSE PRACTITIONER

## 2020-08-17 PROCEDURE — 96372 THER/PROPH/DIAG INJ SC/IM: CPT

## 2020-08-17 PROCEDURE — G0378 HOSPITAL OBSERVATION PER HR: HCPCS

## 2020-08-17 PROCEDURE — 99203 OFFICE O/P NEW LOW 30 MIN: CPT | Performed by: SURGERY

## 2020-08-17 PROCEDURE — 6360000002 HC RX W HCPCS: Performed by: FAMILY MEDICINE

## 2020-08-17 PROCEDURE — APPNB30 APP NON BILLABLE TIME 0-30 MINS: Performed by: NURSE PRACTITIONER

## 2020-08-17 PROCEDURE — 6370000000 HC RX 637 (ALT 250 FOR IP): Performed by: FAMILY MEDICINE

## 2020-08-17 PROCEDURE — 94760 N-INVAS EAR/PLS OXIMETRY 1: CPT

## 2020-08-17 PROCEDURE — 80048 BASIC METABOLIC PNL TOTAL CA: CPT

## 2020-08-17 PROCEDURE — 36415 COLL VENOUS BLD VENIPUNCTURE: CPT

## 2020-08-17 PROCEDURE — 96376 TX/PRO/DX INJ SAME DRUG ADON: CPT

## 2020-08-17 PROCEDURE — 6370000000 HC RX 637 (ALT 250 FOR IP): Performed by: NURSE PRACTITIONER

## 2020-08-17 PROCEDURE — 2580000003 HC RX 258: Performed by: FAMILY MEDICINE

## 2020-08-17 PROCEDURE — 6370000000 HC RX 637 (ALT 250 FOR IP)

## 2020-08-17 PROCEDURE — 2580000003 HC RX 258: Performed by: NURSE PRACTITIONER

## 2020-08-17 PROCEDURE — 85027 COMPLETE CBC AUTOMATED: CPT

## 2020-08-17 RX ORDER — SODIUM CHLORIDE 9 MG/ML
INJECTION, SOLUTION INTRAVENOUS CONTINUOUS
Status: DISCONTINUED | OUTPATIENT
Start: 2020-08-17 | End: 2020-08-19 | Stop reason: HOSPADM

## 2020-08-17 RX ORDER — RAMIPRIL 5 MG/1
10 CAPSULE ORAL DAILY
Status: DISCONTINUED | OUTPATIENT
Start: 2020-08-17 | End: 2020-08-19 | Stop reason: HOSPADM

## 2020-08-17 RX ORDER — RAMIPRIL 5 MG/1
CAPSULE ORAL
Status: COMPLETED
Start: 2020-08-17 | End: 2020-08-17

## 2020-08-17 RX ORDER — POLYETHYLENE GLYCOL 3350 17 G/17G
17 POWDER, FOR SOLUTION ORAL DAILY
Status: DISCONTINUED | OUTPATIENT
Start: 2020-08-17 | End: 2020-08-19 | Stop reason: HOSPADM

## 2020-08-17 RX ORDER — POTASSIUM CHLORIDE 20 MEQ/1
TABLET, EXTENDED RELEASE ORAL
Status: COMPLETED
Start: 2020-08-17 | End: 2020-08-17

## 2020-08-17 RX ORDER — POTASSIUM CHLORIDE 20 MEQ/1
20 TABLET, EXTENDED RELEASE ORAL ONCE
Status: COMPLETED | OUTPATIENT
Start: 2020-08-17 | End: 2020-08-17

## 2020-08-17 RX ADMIN — SODIUM CHLORIDE: 9 INJECTION, SOLUTION INTRAVENOUS at 19:00

## 2020-08-17 RX ADMIN — MORPHINE SULFATE 2 MG: 2 INJECTION, SOLUTION INTRAMUSCULAR; INTRAVENOUS at 06:29

## 2020-08-17 RX ADMIN — RAMIPRIL: 5 CAPSULE ORAL at 19:00

## 2020-08-17 RX ADMIN — POLYETHYLENE GLYCOL 3350 17 G: 17 POWDER, FOR SOLUTION ORAL at 22:07

## 2020-08-17 RX ADMIN — POLYETHYLENE GLYCOL 3350 17 G: 17 POWDER, FOR SOLUTION ORAL at 14:56

## 2020-08-17 RX ADMIN — Medication 10 ML: at 04:23

## 2020-08-17 RX ADMIN — Medication 10 ML: at 01:48

## 2020-08-17 RX ADMIN — RAMIPRIL 10 MG: 5 CAPSULE ORAL at 19:00

## 2020-08-17 RX ADMIN — PROMETHAZINE HYDROCHLORIDE 12.5 MG: 25 TABLET ORAL at 19:00

## 2020-08-17 RX ADMIN — Medication 10 ML: at 23:14

## 2020-08-17 RX ADMIN — ONDANSETRON 4 MG: 2 INJECTION INTRAMUSCULAR; INTRAVENOUS at 10:29

## 2020-08-17 RX ADMIN — Medication 10 ML: at 06:29

## 2020-08-17 RX ADMIN — POTASSIUM CHLORIDE 20 MEQ: 1500 TABLET, EXTENDED RELEASE ORAL at 19:00

## 2020-08-17 RX ADMIN — ONDANSETRON 4 MG: 2 INJECTION INTRAMUSCULAR; INTRAVENOUS at 04:23

## 2020-08-17 RX ADMIN — BISACODYL 10 MG: 5 TABLET, COATED ORAL at 19:09

## 2020-08-17 RX ADMIN — KETOROLAC TROMETHAMINE 15 MG: 30 INJECTION, SOLUTION INTRAMUSCULAR at 00:41

## 2020-08-17 RX ADMIN — GABAPENTIN 800 MG: 400 CAPSULE ORAL at 14:47

## 2020-08-17 RX ADMIN — KETOROLAC TROMETHAMINE 15 MG: 30 INJECTION, SOLUTION INTRAMUSCULAR at 08:43

## 2020-08-17 RX ADMIN — GABAPENTIN 800 MG: 400 CAPSULE ORAL at 08:42

## 2020-08-17 RX ADMIN — MORPHINE SULFATE 2 MG: 2 INJECTION, SOLUTION INTRAMUSCULAR; INTRAVENOUS at 10:29

## 2020-08-17 RX ADMIN — ENOXAPARIN SODIUM 40 MG: 40 INJECTION SUBCUTANEOUS at 08:42

## 2020-08-17 RX ADMIN — MORPHINE SULFATE 2 MG: 2 INJECTION, SOLUTION INTRAMUSCULAR; INTRAVENOUS at 23:13

## 2020-08-17 RX ADMIN — MORPHINE SULFATE 2 MG: 2 INJECTION, SOLUTION INTRAMUSCULAR; INTRAVENOUS at 19:00

## 2020-08-17 RX ADMIN — PANTOPRAZOLE SODIUM 40 MG: 40 TABLET, DELAYED RELEASE ORAL at 05:45

## 2020-08-17 RX ADMIN — CLOPIDOGREL 75 MG: 75 TABLET, FILM COATED ORAL at 08:42

## 2020-08-17 RX ADMIN — POTASSIUM CHLORIDE: 1500 TABLET, EXTENDED RELEASE ORAL at 19:00

## 2020-08-17 RX ADMIN — BISACODYL: 5 TABLET, COATED ORAL at 19:00

## 2020-08-17 RX ADMIN — PROMETHAZINE HYDROCHLORIDE 12.5 MG: 25 TABLET ORAL at 05:46

## 2020-08-17 RX ADMIN — PROMETHAZINE HYDROCHLORIDE 12.5 MG: 25 TABLET ORAL at 12:03

## 2020-08-17 RX ADMIN — Medication 10 ML: at 08:43

## 2020-08-17 RX ADMIN — ONDANSETRON 4 MG: 2 INJECTION INTRAMUSCULAR; INTRAVENOUS at 23:38

## 2020-08-17 RX ADMIN — LEVOTHYROXINE SODIUM 50 MCG: 0.03 TABLET ORAL at 05:45

## 2020-08-17 RX ADMIN — MORPHINE SULFATE 2 MG: 2 INJECTION, SOLUTION INTRAMUSCULAR; INTRAVENOUS at 01:48

## 2020-08-17 RX ADMIN — MORPHINE SULFATE 2 MG: 2 INJECTION, SOLUTION INTRAMUSCULAR; INTRAVENOUS at 14:47

## 2020-08-17 RX ADMIN — ONDANSETRON 4 MG: 2 INJECTION INTRAMUSCULAR; INTRAVENOUS at 17:12

## 2020-08-17 RX ADMIN — GABAPENTIN 800 MG: 400 CAPSULE ORAL at 22:07

## 2020-08-17 RX ADMIN — KETOROLAC TROMETHAMINE 15 MG: 30 INJECTION, SOLUTION INTRAMUSCULAR at 17:12

## 2020-08-17 ASSESSMENT — PAIN SCALES - GENERAL
PAINLEVEL_OUTOF10: 8
PAINLEVEL_OUTOF10: 8
PAINLEVEL_OUTOF10: 9
PAINLEVEL_OUTOF10: 7
PAINLEVEL_OUTOF10: 6
PAINLEVEL_OUTOF10: 0
PAINLEVEL_OUTOF10: 8

## 2020-08-17 ASSESSMENT — PAIN DESCRIPTION - LOCATION
LOCATION: ABDOMEN;BACK;CHEST
LOCATION: ABDOMEN;BACK;CHEST

## 2020-08-17 ASSESSMENT — PAIN DESCRIPTION - PAIN TYPE
TYPE: CHRONIC PAIN
TYPE: ACUTE PAIN

## 2020-08-17 ASSESSMENT — PAIN DESCRIPTION - DESCRIPTORS: DESCRIPTORS: SHARP

## 2020-08-17 NOTE — PROGRESS NOTES
Hospitalist Progress Note      PCP: Marc Duncan DO    Date of Admission: 8/15/2020    Chief Complaint: abd pain nausea/vomiting and fall     Hospital Course: Patient is a 46 yo female with hx CAD/stent (2014), HTN, HLD, PVD, duodenal stenosis s/p dilation (2016), thyroid disease, tobacco use. She presented to ER initially 8/14 with abdominal pain, n/v, fall. CT abdomen and pelvis showed moderate pericardial effusion, no acute process of abdomen/pelvis. She was admitted for further workup but subsequently left AMA. She continued to experience pain so she returned to hospital.      Subjective: EMR and notes reviewed pt seen and examined. C/O abd pain constant nausea and vomiting. Medications:  Reviewed    Infusion Medications   Scheduled Medications    lidocaine  1 patch Transdermal Daily    glycerin  1 suppository Rectal Once    levothyroxine  50 mcg Oral Daily    nicotine  1 patch Transdermal Daily    clopidogrel  75 mg Oral Daily    gabapentin  800 mg Oral TID    metoprolol succinate  50 mg Oral Daily    pantoprazole  40 mg Oral QAM AC    sodium chloride flush  10 mL Intravenous 2 times per day    enoxaparin  40 mg Subcutaneous Daily     PRN Meds: sodium chloride flush, acetaminophen **OR** acetaminophen, polyethylene glycol, promethazine **OR** ondansetron, ketorolac, morphine      Intake/Output Summary (Last 24 hours) at 8/17/2020 1045  Last data filed at 8/16/2020 2208  Gross per 24 hour   Intake 120 ml   Output --   Net 120 ml       Physical Exam Performed:    BP (!) 163/82   Pulse 55   Temp 98.6 °F (37 °C) (Oral)   Resp 14   Ht 5' 3\" (1.6 m)   Wt 119 lb (54 kg)   SpO2 99%   BMI 21.08 kg/m²     General appearance: ill appearing older than chonological age   HEENT: Pupils equal, round, and reactive to light. Conjunctivae/corneas clear. Neck: Supple, with full range of motion. No jugular venous distention. Trachea midline. Respiratory:  Normal respiratory effort.  Clear to auscultation, bilaterally without Rales/Wheezes/Rhonchi. Cardiovascular: Regular rate and rhythm with normal S1/S2 without murmurs, rubs or gallops. Abdomen: Soft, non-tender, non-distended with normal bowel sounds. Musculoskeletal: No clubbing, cyanosis or edema bilaterally. Full range of motion without deformity. Skin: Skin color, texture, turgor normal.  No rashes or lesions. Neurologic:  Neurovascularly intact without any focal sensory/motor deficits. Cranial nerves: II-XII intact, grossly non-focal.  Psychiatric: Alert and oriented, thought content appropriate, normal insight  Capillary Refill: Brisk,< 3 seconds   Peripheral Pulses: +2 palpable, equal bilaterally       Labs:   Recent Labs     08/14/20  1713 08/15/20  1429   WBC 6.4 8.0   HGB 12.4 12.1   HCT 37.5 34.9*    228     Recent Labs     08/14/20  1713 08/15/20  1429   * 135*   K 4.7 3.7   CL 96* 100   CO2 23 24   BUN 13 19   CREATININE 0.8 1.0   CALCIUM 10.1 10.2     Recent Labs     08/14/20  1713   AST 31   ALT 6*   BILITOT 0.4   ALKPHOS 61     Recent Labs     08/15/20  1429   INR 1.01     Recent Labs     08/15/20  1429 08/15/20  1757 08/15/20  2152   Charyl Sandra <0.01 <0.01 <0.01       Urinalysis:      Lab Results   Component Value Date    NITRU Negative 08/16/2020    WBCUA 2 08/16/2020    BACTERIA RARE 06/19/2016    RBCUA 6 08/16/2020    BLOODU TRACE 08/16/2020    SPECGRAV >1.030 08/16/2020    GLUCOSEU Negative 08/16/2020    GLUCOSEU NEGATIVE 12/08/2009       Radiology:  XR CHEST PORTABLE   Final Result   Stable pulmonary hyperinflation. No acute pulmonary process. Assessment/Plan:    Active Hospital Problems    Diagnosis    Celiac artery stenosis (HCC) [I77.4]     Priority: High    Chest pain [R07.9]    PUD (peptic ulcer disease) [K27.9]     Abd pain nausea and vomiting unclear etiology   - known hx of PUD and celiac artery stenosis. - CTA of abd suggested moderate stenosis of celiac artery.   - Vascular surgery was consulted, scans reviewed pt \" no signs of mesenteric ischemia SMA and SARAH BETH patent \" no further testing needed at this laurent   - could consider GI consult or gastric emptying study    Constipation: lg stool burden seen on CT suspect contributing to clinical picture   - given supp no BM for 2 days per pt   - aggressive bowel reg    PUD: continue PPI     Pericardial effusion / CAD / chest pain. Patient with trace pericardial effusion on previous echo. Moderate pericardial effusion noted on CT scan. Discussed with cardiology, CT scan not reliable for measuring pericardial effusion. No evidence of pericarditis, no indication for echo. Cardiology has signed off. Continue Plavix. Back pain. CT showed advanced degenerative changes in lumbar spine but no acute fracture. Add lidocaine patch. May also use heat or ice to back. Hypothyroidism. S/p thyroid radiation ablation 2018. Has not followed with endocrinology or levothyroxine. TSH 44, Free T4 0.2. Begin levothyroxine 50 mcg daily. Urinary frequency. Patient afebrile. No leukocytosis. Check UA negative- suspect 2/2 to constipation     HTN: continue home meds     Tobacco use. Encouraged cessation. Nicotine patch. Bradycardia: suspect 2/2 to BB will stop as pt says she does not take this am home.    - restart ACE            DVT Prophylaxis: lovenox   Diet: DIET RENAL;  Code Status: Full Code    PT/OT Eval Status: na    Dispo - 1-2 days     STEPHANIE Kong - ARGENIS

## 2020-08-17 NOTE — PROGRESS NOTES
Orders received from NP to give potassium chloride 20 mEq PO once and ramipril 10 mg PO daily to pt. Will follow the NP orders.

## 2020-08-17 NOTE — CONSULTS
[facility]  Consultation Note    Chief Complaint / Reason for Consultation  Celiac artery stenosis     History of Present Illness  Patient is a 47 y.o. female with past medical history of hypertension, CAD, thyroid disease, duodenal stenosis s/p dilation 2016, tobacco use and celiac artery stenosis who presented to the ED initially on 8/14 with complaints of chest pain, abdominal pain and nausea and vomiting and fall. She reports on Thursday she started having chest and abdominal pain along with ankle swelling and that yesterday morning is when se started to have nausea and vomiting. She reports the abdominal pain is mid abdominal area. She denies any diarrhea or constipation. She denies any recent weight loss or pain associated with eating. She has known celiac artery stenosis. Her last EGD and colonoscopy was in January 2018. She smokes about 1/2 PPD. In ED, CT abd/pelvis showed moderate pericardial effusion and trace right pleural effusion. CTA abd/pelvis showed celiac artery stenosis. She was admitted for further workup. She then left AMA and represented the following day with continued complaints of pain. We have been consulted for further recommendations regarding celiac artery stenosis. Review of Systems  + abdominal pain, + nausea, + vomiting. Denies fevers, chills, chest pain, shortness of breath, hematemesis, diarrhea, constipation, melena, hematochezia, wt changes, vision problems, blindness, hearing problems, facial droop, slurred speech, extremity weakness, extremity numbness, dysuria.     Past Medical History:   Diagnosis Date    Candida esophagitis ([facility] [address] 01/22/2018    On EGD    Celiac artery stenosis (HCC)     Childhood asthma     Chronic midline low back pain without sciatica     S/P Laminectomy 2004 / 2008    Chronic migraine w/o aura w/o status migrainosus, not intractable     Coronary artery disease involving native coronary artery of native heart without angina pectoris 06/2014    S/P Angioplasty and Stent x 1 / Dr. Maria Del Carmen Yoon Duodenal stenosis 7-28-15    Dilated with balloon per Dr. Sola Becerril hypertension     Hiatal hernia 01/22/2018    On EGD    Hypercholesterolemia     Hyperthyroidism 12/29/2017    Movement disorder     PUD (peptic ulcer disease)     Subclavian artery stenosis ([address]     Tobacco abuse        Past Surgical History:   Procedure Laterality Date    BACK SURGERY      x 2    CARDIAC SURGERY      COLONOSCOPY      HYSTERECTOMY  1994    Partial    LAPAROSCOPY  1992    LAPAROTOMY  1993    Lysis of Adhesion    LUMBAR LAMINECTOMY  2004 / 2008    [facility] in 2008   [facility]    UPPER GASTROINTESTINAL ENDOSCOPY  6/1/15    with biopsies and brushings     UPPER GASTROINTESTINAL ENDOSCOPY  7/28/15    UPPER GASTROINTESTINAL ENDOSCOPY  4/25/16    gastric biopsy, duodenal stenosis dilation     VASCULAR SURGERY  01/2015    Subclavian Artery Stent    WISDOM TOOTH EXTRACTION         Allergies   Allergen Reactions    Iodine Swelling     Throat swelling    Penicillins Rash       Social History     Socioeconomic History    Marital status:      Spouse name: Maria Elena Faith Number of children: 2    Years of education: 13    Highest education level: Not on file   Occupational History    Occupation: Disabled    Social Needs    Financial resource strain: Not on file    Food insecurity     Worry: Not on file     Inability: Not on file   [employer] needs     Medical: Not on file     Non-medical: Not on file   Tobacco Use    Smoking status: Current Every Day Smoker     Packs/day: 0.50     Years: 10.00     Pack years: 5.00     Types: Cigarettes    Smokeless tobacco: Never Used   Substance and Sexual Activity    Alcohol use: No    Drug use: No    Sexual activity: Yes     Partners: Male   Lifestyle    Physical activity     Days per week: Not on file     Minutes per session: Not on file    Stress: Not on file   Relationships    Social connections     Talks on phone: Not on file     Gets together: Not on file     Attends Yazidism service: Not on file     Active member of club or organization: Not on file     Attends meetings of clubs or organizations: Not on file     Relationship status: Not on file    Intimate partner violence     Fear of current or ex partner: Not on file     Emotionally abused: Not on file     Physically abused: Not on file     Forced sexual activity: Not on file   Other Topics Concern    Not on file   Social History Narrative    Not on file       Family History   Problem Relation Age of Onset    Cancer Mother         Breast     High Blood Pressure Father     Heart Disease Father         MI    Diabetes Paternal Aunt     Asthma Son     Asthma Daughter      - No history of bleeding or clotting disorders    Vital Signs  Vitals:    08/17/20 0625 08/17/20 0806 08/17/20 0830 08/17/20 1039   BP: (!) 168/93  (!) 149/82 (!) 163/82   Pulse: 83  71 55   Resp:  16 16 14   Temp:   98.5 °F (36.9 °C) 98.6 °F (37 °C)   TempSrc:   Oral Oral   SpO2:  100% 100% 99%   Weight:       Height:           Physical Examination  General: no apparent distress, appears stated age  Psychiatric: affect appropriate  Head/Eyes/Ears/Nose/Throat:  Normocephalic, atraumatic, PERRLA, face symmetric  Neck:  no adenopathy, no carotid bruit, no JVD, supple, symmetrical, trachea midline, thyroid not enlarged, no tenderness/mass/nodules  Chest/Lungs: clear to auscultation bilaterally, no accessory muscle use  Cardiac:  regular rate and rhythm, S1, S2 normal, no murmur, click, rub or gallop  Abdomen: soft, mild tenderness mid abdomen to palpation, active bowel sounds  Extremities: warm and well perfused, no signs of cyanosis or ischemia, no significant edema, bilateral upper and lower extremity motorsensory intact  Vascular exam:  - R radial: 2+  - L radial: 2+  - R femoral: 2+  - L femoral: 2  - R DP: 1+  - L DP: 1+  - R PT: 1+  - L PT: 1+    Labs  Lab Results   Component Value Date    WBC 5.7 08/17/2020    HGB 13.1 08/17/2020    HCT 38.2 08/17/2020    .1 08/17/2020     08/17/2020     Lab Results   Component Value Date     08/17/2020    K 3.3 08/17/2020    K 3.7 08/15/2020    CL 97 08/17/2020    CO2 21 08/17/2020    PHOS 2.9 04/28/2016    BUN 18 08/17/2020    CREATININE 0.7 08/17/2020      No components found for: GLU    Scheduled Meds:    lidocaine  1 patch Transdermal Daily    glycerin  1 suppository Rectal Once    levothyroxine  50 mcg Oral Daily    nicotine  1 patch Transdermal Daily    clopidogrel  75 mg Oral Daily    gabapentin  800 mg Oral TID    metoprolol succinate  50 mg Oral Daily    pantoprazole  40 mg Oral QAM AC    sodium chloride flush  10 mL Intravenous 2 times per day    enoxaparin  40 mg Subcutaneous Daily     Continuous Infusions:     Imaging:     CT abd/pelvis w/o 8/14/20:    Impression    Abdomen/pelvis: Moderate pericardial effusion and trace amount of right    pleural fluid.  These are new findings from the patient's abdomen/pelvis CT    February 4, 2018.  Is doubtful that this is due to trauma but of uncertain    etiology.  No noncontrast CT evidence of acute or traumatic process of the    abdomen pelvis.         Thoracic and lumbar spine: No acute or traumatic abnormality of the thoracic    spine.  No fracture             CTA abd/pelvis 8/14/20:  Impression    No evidence of an acute aortic syndrome.         Extrinsic narrowing of the proximal celiac artery resulting in mild    narrowing, however suspect a mixed calcified plaque at the celiac artery    origin (best appreciated on curved multiplanar reformatted views), which    seems to result in at least moderate luminal narrowing at the origin.  The    former features may be seen with median arcuate ligament syndrome, perhaps    intensified by atherosclerotic disease.         No acute nonvascular findings in the abdomen or pelvis.         Large colonic stool volume without obstruction.  Query constipation.         Small pericardial effusion.         The appearance of ground-glass opacities at the lung bases likely reflects    volume averaging of subsegmental atelectasis is opposed to areas of true    airspace disease.  Correlate for any respiratory symptoms. Assessment:   Celiac artery stenosis - CTA reviewed, showing narrowing of the celiac artery with patent SMA and SARAH BETH, no signs of mesenteric ischemia, denies weight loss or postprandial pain  Abdominal pain, N/V  Chest pain  Pericardial effusion   Hypertension  H/o PUD s/p dilation 2016  Tobacco use    Plan:  CTA reviewed showing narrowing of the celiac artery with patent SMA and SARAH BETH. No signs of mesenteric ischemia. Do not feel that her celiac artery stenosis is cause of her nausea or vomiting or abdominal pain. CT did show large colonic stool volume without obstruction which could be contributing to some of her symptoms. Recommend increase bowel regimen. Recommend Aspirin 81 mg daily and smoking cessation. No further vascular testing/intervention at this time. Will discuss with Dr. Oliver Caal and any further recommendations to follow. Thank you for the consultation. Patient educated on plan of care and disease process. All questions answered. Electronically signed by STEPHANIE Rayo CNP on 8/17/2020 at 2:26 PM     Vascular Staff    I independently performed an evaluation on [facility]. I have reviewed the above documentation completed by Valdez Khalil CNP. Please see my additional contributions to the HPI, physical exam, assessment, and medical decision making. 78-year-old female presented with increased complaints of abdominal pain, nausea and vomiting. She states the nausea and vomiting has been persistent over the last 12-24 hours. She does have a long history of tobacco abuse.   She denies any pain with eating

## 2020-08-17 NOTE — PLAN OF CARE
Problem: Falls - Risk of:  Goal: Will remain free from falls  Description: Will remain free from falls  8/17/2020 1303 by Lena Salgado RN  Outcome: Ongoing  Note: Pt assessed for fall. Pt is a medium fall risk. Fall precautions in place. Pt ambulates independently in room. Bed locked in lowest position and 2/4 rails up. Nonskid socks on. Call light and personal belongings within reach. Instructed pt to call out for needs. Pt verbalized understanding. Will continue to monitor. Problem: Pain:  Goal: Control of acute pain  Description: Control of acute pain  Note: Pt assessed for pain. Pt educated on pain rating scale. Pt c/o 9/10 pain. See MAR for pain interventions. Pt c/o 7/10 pain upon reassessment. Pt educated to call RN if pain gets worse. Pt verbalized understanding. Stimuli reduced. Rest promoted. Call light within reach. Will continue to monitor.

## 2020-08-17 NOTE — CARE COORDINATION
Patient admitted as Observation with an anticipated short hospitalization length of stay. Chart reviewed and it appears that patient has minimal needs for discharge at this time. Discussed with patients nurse and requested that case management be notified if discharge needs are identified. Met with patient, denies D/C needs     Case management will continue to follow progress and update discharge plan as needed.

## 2020-08-17 NOTE — PLAN OF CARE
Problem: Falls - Risk of:  Goal: Will remain free from falls  Description: Will remain free from falls  Outcome: Ongoing     Problem: Pain:  Goal: Pain level will decrease  Description: Pain level will decrease  Outcome: Ongoing   Pt complains of abdominal and back pain 8/10, pt alert and oriented x4, &IV morphine given per MAR.    Vitals:    08/17/20 0146   BP: (!) 160/87   Pulse: 58   Resp: 19   Temp:    SpO2:

## 2020-08-18 LAB
ALBUMIN SERPL-MCNC: 4.7 G/DL (ref 3.4–5)
ALP BLD-CCNC: 59 U/L (ref 40–129)
ALT SERPL-CCNC: <5 U/L (ref 10–40)
AMYLASE: 38 U/L (ref 25–115)
ANION GAP SERPL CALCULATED.3IONS-SCNC: 15 MMOL/L (ref 3–16)
AST SERPL-CCNC: 25 U/L (ref 15–37)
BASOPHILS ABSOLUTE: 0 K/UL (ref 0–0.2)
BASOPHILS RELATIVE PERCENT: 0.4 %
BILIRUB SERPL-MCNC: 0.6 MG/DL (ref 0–1)
BILIRUBIN DIRECT: <0.2 MG/DL (ref 0–0.3)
BILIRUBIN, INDIRECT: ABNORMAL MG/DL (ref 0–1)
BUN BLDV-MCNC: 10 MG/DL (ref 7–20)
CALCIUM SERPL-MCNC: 9.1 MG/DL (ref 8.3–10.6)
CHLORIDE BLD-SCNC: 95 MMOL/L (ref 99–110)
CO2: 24 MMOL/L (ref 21–32)
CREAT SERPL-MCNC: 0.8 MG/DL (ref 0.6–1.1)
EOSINOPHILS ABSOLUTE: 0 K/UL (ref 0–0.6)
EOSINOPHILS RELATIVE PERCENT: 0.6 %
GFR AFRICAN AMERICAN: >60
GFR NON-AFRICAN AMERICAN: >60
GLUCOSE BLD-MCNC: 112 MG/DL (ref 70–99)
HCT VFR BLD CALC: 41.1 % (ref 36–48)
HEMOGLOBIN: 13.8 G/DL (ref 12–16)
LIPASE: 19 U/L (ref 13–60)
LYMPHOCYTES ABSOLUTE: 1.9 K/UL (ref 1–5.1)
LYMPHOCYTES RELATIVE PERCENT: 31.1 %
MAGNESIUM: 2.2 MG/DL (ref 1.8–2.4)
MCH RBC QN AUTO: 33.9 PG (ref 26–34)
MCHC RBC AUTO-ENTMCNC: 33.4 G/DL (ref 31–36)
MCV RBC AUTO: 101.5 FL (ref 80–100)
MONOCYTES ABSOLUTE: 0.5 K/UL (ref 0–1.3)
MONOCYTES RELATIVE PERCENT: 7.7 %
NEUTROPHILS ABSOLUTE: 3.7 K/UL (ref 1.7–7.7)
NEUTROPHILS RELATIVE PERCENT: 60.2 %
PDW BLD-RTO: 15.7 % (ref 12.4–15.4)
PLATELET # BLD: 232 K/UL (ref 135–450)
PMV BLD AUTO: 8.3 FL (ref 5–10.5)
POTASSIUM REFLEX MAGNESIUM: 3.2 MMOL/L (ref 3.5–5.1)
RBC # BLD: 4.05 M/UL (ref 4–5.2)
SODIUM BLD-SCNC: 134 MMOL/L (ref 136–145)
TOTAL PROTEIN: 8 G/DL (ref 6.4–8.2)
WBC # BLD: 6.2 K/UL (ref 4–11)

## 2020-08-18 PROCEDURE — 6360000002 HC RX W HCPCS: Performed by: NURSE PRACTITIONER

## 2020-08-18 PROCEDURE — 83690 ASSAY OF LIPASE: CPT

## 2020-08-18 PROCEDURE — 82150 ASSAY OF AMYLASE: CPT

## 2020-08-18 PROCEDURE — 6370000000 HC RX 637 (ALT 250 FOR IP): Performed by: FAMILY MEDICINE

## 2020-08-18 PROCEDURE — APPNB30 APP NON BILLABLE TIME 0-30 MINS: Performed by: NURSE PRACTITIONER

## 2020-08-18 PROCEDURE — 85025 COMPLETE CBC W/AUTO DIFF WBC: CPT

## 2020-08-18 PROCEDURE — 2580000003 HC RX 258: Performed by: NURSE PRACTITIONER

## 2020-08-18 PROCEDURE — 96376 TX/PRO/DX INJ SAME DRUG ADON: CPT

## 2020-08-18 PROCEDURE — 80076 HEPATIC FUNCTION PANEL: CPT

## 2020-08-18 PROCEDURE — 6360000002 HC RX W HCPCS: Performed by: FAMILY MEDICINE

## 2020-08-18 PROCEDURE — 83735 ASSAY OF MAGNESIUM: CPT

## 2020-08-18 PROCEDURE — 80048 BASIC METABOLIC PNL TOTAL CA: CPT

## 2020-08-18 PROCEDURE — 36415 COLL VENOUS BLD VENIPUNCTURE: CPT

## 2020-08-18 PROCEDURE — 96375 TX/PRO/DX INJ NEW DRUG ADDON: CPT

## 2020-08-18 PROCEDURE — G0378 HOSPITAL OBSERVATION PER HR: HCPCS

## 2020-08-18 PROCEDURE — 6370000000 HC RX 637 (ALT 250 FOR IP): Performed by: NURSE PRACTITIONER

## 2020-08-18 PROCEDURE — 2580000003 HC RX 258: Performed by: FAMILY MEDICINE

## 2020-08-18 PROCEDURE — 94760 N-INVAS EAR/PLS OXIMETRY 1: CPT

## 2020-08-18 PROCEDURE — 6370000000 HC RX 637 (ALT 250 FOR IP): Performed by: PHYSICIAN ASSISTANT

## 2020-08-18 PROCEDURE — APPSS30 APP SPLIT SHARED TIME 16-30 MINUTES: Performed by: NURSE PRACTITIONER

## 2020-08-18 PROCEDURE — 96372 THER/PROPH/DIAG INJ SC/IM: CPT

## 2020-08-18 RX ORDER — SODIUM PHOSPHATE, DIBASIC AND SODIUM PHOSPHATE, MONOBASIC 7; 19 G/133ML; G/133ML
1 ENEMA RECTAL
Status: COMPLETED | OUTPATIENT
Start: 2020-08-18 | End: 2020-08-18

## 2020-08-18 RX ORDER — POTASSIUM CHLORIDE 20 MEQ/1
40 TABLET, EXTENDED RELEASE ORAL PRN
Status: DISCONTINUED | OUTPATIENT
Start: 2020-08-18 | End: 2020-08-19 | Stop reason: HOSPADM

## 2020-08-18 RX ORDER — METOCLOPRAMIDE HYDROCHLORIDE 5 MG/ML
10 INJECTION INTRAMUSCULAR; INTRAVENOUS EVERY 6 HOURS
Status: DISCONTINUED | OUTPATIENT
Start: 2020-08-18 | End: 2020-08-19 | Stop reason: HOSPADM

## 2020-08-18 RX ORDER — POTASSIUM CHLORIDE 7.45 MG/ML
10 INJECTION INTRAVENOUS PRN
Status: DISCONTINUED | OUTPATIENT
Start: 2020-08-18 | End: 2020-08-19 | Stop reason: HOSPADM

## 2020-08-18 RX ADMIN — MORPHINE SULFATE 2 MG: 2 INJECTION, SOLUTION INTRAMUSCULAR; INTRAVENOUS at 18:33

## 2020-08-18 RX ADMIN — POLYETHYLENE GLYCOL 3350 17 G: 17 POWDER, FOR SOLUTION ORAL at 08:16

## 2020-08-18 RX ADMIN — ENOXAPARIN SODIUM 40 MG: 40 INJECTION SUBCUTANEOUS at 08:15

## 2020-08-18 RX ADMIN — KETOROLAC TROMETHAMINE 15 MG: 30 INJECTION, SOLUTION INTRAMUSCULAR at 08:15

## 2020-08-18 RX ADMIN — METOCLOPRAMIDE HYDROCHLORIDE 10 MG: 5 INJECTION INTRAMUSCULAR; INTRAVENOUS at 22:35

## 2020-08-18 RX ADMIN — Medication 10 ML: at 21:46

## 2020-08-18 RX ADMIN — MORPHINE SULFATE 2 MG: 2 INJECTION, SOLUTION INTRAMUSCULAR; INTRAVENOUS at 22:35

## 2020-08-18 RX ADMIN — METOCLOPRAMIDE HYDROCHLORIDE 10 MG: 5 INJECTION INTRAMUSCULAR; INTRAVENOUS at 18:33

## 2020-08-18 RX ADMIN — PROMETHAZINE HYDROCHLORIDE 12.5 MG: 25 TABLET ORAL at 21:44

## 2020-08-18 RX ADMIN — MORPHINE SULFATE 2 MG: 2 INJECTION, SOLUTION INTRAMUSCULAR; INTRAVENOUS at 09:01

## 2020-08-18 RX ADMIN — ONDANSETRON 4 MG: 2 INJECTION INTRAMUSCULAR; INTRAVENOUS at 18:33

## 2020-08-18 RX ADMIN — CLOPIDOGREL 75 MG: 75 TABLET, FILM COATED ORAL at 08:14

## 2020-08-18 RX ADMIN — PROMETHAZINE HYDROCHLORIDE 12.5 MG: 25 TABLET ORAL at 15:42

## 2020-08-18 RX ADMIN — PROMETHAZINE HYDROCHLORIDE 12.5 MG: 25 TABLET ORAL at 01:21

## 2020-08-18 RX ADMIN — GABAPENTIN 800 MG: 400 CAPSULE ORAL at 08:15

## 2020-08-18 RX ADMIN — MORPHINE SULFATE 2 MG: 2 INJECTION, SOLUTION INTRAMUSCULAR; INTRAVENOUS at 14:01

## 2020-08-18 RX ADMIN — RAMIPRIL 10 MG: 5 CAPSULE ORAL at 08:13

## 2020-08-18 RX ADMIN — SODIUM CHLORIDE: 9 INJECTION, SOLUTION INTRAVENOUS at 23:52

## 2020-08-18 RX ADMIN — MORPHINE SULFATE 2 MG: 2 INJECTION, SOLUTION INTRAMUSCULAR; INTRAVENOUS at 04:31

## 2020-08-18 RX ADMIN — GABAPENTIN 800 MG: 400 CAPSULE ORAL at 14:01

## 2020-08-18 RX ADMIN — LEVOTHYROXINE SODIUM 50 MCG: 0.03 TABLET ORAL at 06:32

## 2020-08-18 RX ADMIN — SODIUM CHLORIDE: 9 INJECTION, SOLUTION INTRAVENOUS at 04:54

## 2020-08-18 RX ADMIN — POTASSIUM CHLORIDE 40 MEQ: 1500 TABLET, EXTENDED RELEASE ORAL at 14:01

## 2020-08-18 RX ADMIN — GABAPENTIN 800 MG: 400 CAPSULE ORAL at 21:44

## 2020-08-18 RX ADMIN — SODIUM PHOSPHATE, DIBASIC AND SODIUM PHOSPHATE, MONOBASIC 1 ENEMA: 7; 19 ENEMA RECTAL at 15:41

## 2020-08-18 RX ADMIN — KETOROLAC TROMETHAMINE 15 MG: 30 INJECTION, SOLUTION INTRAMUSCULAR at 01:21

## 2020-08-18 RX ADMIN — MAGNESIUM CITRATE 296 ML: 1.75 LIQUID ORAL at 15:41

## 2020-08-18 RX ADMIN — ONDANSETRON 4 MG: 2 INJECTION INTRAMUSCULAR; INTRAVENOUS at 06:32

## 2020-08-18 RX ADMIN — METOCLOPRAMIDE HYDROCHLORIDE 10 MG: 5 INJECTION INTRAMUSCULAR; INTRAVENOUS at 14:01

## 2020-08-18 RX ADMIN — SODIUM CHLORIDE: 9 INJECTION, SOLUTION INTRAVENOUS at 14:01

## 2020-08-18 RX ADMIN — Medication 10 ML: at 08:18

## 2020-08-18 RX ADMIN — PANTOPRAZOLE SODIUM 40 MG: 40 TABLET, DELAYED RELEASE ORAL at 06:32

## 2020-08-18 ASSESSMENT — PAIN DESCRIPTION - FREQUENCY
FREQUENCY: INTERMITTENT
FREQUENCY: INTERMITTENT

## 2020-08-18 ASSESSMENT — PAIN DESCRIPTION - PROGRESSION
CLINICAL_PROGRESSION: NOT CHANGED
CLINICAL_PROGRESSION: NOT CHANGED

## 2020-08-18 ASSESSMENT — PAIN SCALES - GENERAL
PAINLEVEL_OUTOF10: 7
PAINLEVEL_OUTOF10: 9
PAINLEVEL_OUTOF10: 8
PAINLEVEL_OUTOF10: 10
PAINLEVEL_OUTOF10: 9
PAINLEVEL_OUTOF10: 8
PAINLEVEL_OUTOF10: 9
PAINLEVEL_OUTOF10: 10
PAINLEVEL_OUTOF10: 9
PAINLEVEL_OUTOF10: 8

## 2020-08-18 ASSESSMENT — PAIN DESCRIPTION - LOCATION
LOCATION: ABDOMEN
LOCATION: ABDOMEN
LOCATION: HEAD;BACK;ABDOMEN

## 2020-08-18 ASSESSMENT — PAIN DESCRIPTION - PAIN TYPE
TYPE: ACUTE PAIN
TYPE: CHRONIC PAIN
TYPE: ACUTE PAIN

## 2020-08-18 ASSESSMENT — PAIN DESCRIPTION - ONSET
ONSET: ON-GOING
ONSET: ON-GOING

## 2020-08-18 ASSESSMENT — PAIN - FUNCTIONAL ASSESSMENT
PAIN_FUNCTIONAL_ASSESSMENT: ACTIVITIES ARE NOT PREVENTED
PAIN_FUNCTIONAL_ASSESSMENT: ACTIVITIES ARE NOT PREVENTED

## 2020-08-18 ASSESSMENT — PAIN DESCRIPTION - DESCRIPTORS
DESCRIPTORS: SHARP

## 2020-08-18 ASSESSMENT — PAIN DESCRIPTION - ORIENTATION
ORIENTATION: UPPER
ORIENTATION: UPPER

## 2020-08-18 NOTE — PROGRESS NOTES
Hospitalist Progress Note      PCP: Christine Muir DO    Date of Admission: 8/15/2020    Chief Complaint: abd pain nausea/vomiting and fall     Hospital Course: Patient is a 48 yo female with hx CAD/stent (2014), HTN, HLD, PVD, duodenal stenosis s/p dilation (2016), thyroid disease, tobacco use. She presented to ER initially 8/14 with abdominal pain, n/v, fall. CT abdomen and pelvis showed moderate pericardial effusion, no acute process of abdomen/pelvis. She was admitted for further workup but subsequently left AMA. She continued to experience pain so she returned to hospital.      Subjective: EMR and notes reviewed pt seen and examined. Remains c/o severe abd pain. Discussed constipation and the pain meds are not helping the situation .    She also has nausea but the vomiting has stopped   Medications:  Reviewed    Infusion Medications    sodium chloride 100 mL/hr at 08/18/20 0454     Scheduled Medications    ramipril  10 mg Oral Daily    polyethylene glycol  17 g Oral Daily    lidocaine  1 patch Transdermal Daily    glycerin  1 suppository Rectal Once    levothyroxine  50 mcg Oral Daily    nicotine  1 patch Transdermal Daily    clopidogrel  75 mg Oral Daily    gabapentin  800 mg Oral TID    pantoprazole  40 mg Oral QAM AC    sodium chloride flush  10 mL Intravenous 2 times per day    enoxaparin  40 mg Subcutaneous Daily     PRN Meds: sodium chloride flush, acetaminophen **OR** acetaminophen, polyethylene glycol, promethazine **OR** ondansetron, ketorolac, morphine      Intake/Output Summary (Last 24 hours) at 8/18/2020 0749  Last data filed at 8/18/2020 2933  Gross per 24 hour   Intake 1276.67 ml   Output --   Net 1276.67 ml       Physical Exam Performed:    BP (!) 162/92   Pulse 76   Temp 98.6 °F (37 °C) (Oral)   Resp 15   Ht 5' 3\" (1.6 m)   Wt 119 lb (54 kg)   SpO2 99%   BMI 21.08 kg/m²     General appearance: ill appearing older than chonological age   HEENT: Pupils equal, round, and reactive to light. Conjunctivae/corneas clear. Neck: Supple, with full range of motion. No jugular venous distention. Trachea midline. Respiratory:  Normal respiratory effort. Clear to auscultation, bilaterally without Rales/Wheezes/Rhonchi. Cardiovascular: Regular rate and rhythm with normal S1/S2 without murmurs, rubs or gallops. Abdomen: Soft, tender, non-distended with normal bowel sounds. Musculoskeletal: No clubbing, cyanosis or edema bilaterally. Full range of motion without deformity. Skin: Skin color, texture, turgor normal.  No rashes or lesions. Neurologic:  Neurovascularly intact without any focal sensory/motor deficits. Cranial nerves: II-XII intact, grossly non-focal.  Psychiatric: Alert and oriented, thought content appropriate, normal insight  Capillary Refill: Brisk,< 3 seconds   Peripheral Pulses: +2 palpable, equal bilaterally       Labs:   Recent Labs     08/15/20  1429 08/17/20  1253   WBC 8.0 5.7   HGB 12.1 13.1   HCT 34.9* 38.2    221     Recent Labs     08/15/20  1429 08/17/20  1253   * 133*   K 3.7 3.3*    97*   CO2 24 21   BUN 19 18   CREATININE 1.0 0.7   CALCIUM 10.2 9.8     No results for input(s): AST, ALT, BILIDIR, BILITOT, ALKPHOS in the last 72 hours. Recent Labs     08/15/20  1429   INR 1.01     Recent Labs     08/15/20  1429 08/15/20  1757 08/15/20  2152   Lethaniel Jim <0.01 <0.01 <0.01       Urinalysis:      Lab Results   Component Value Date    NITRU Negative 08/16/2020    WBCUA 2 08/16/2020    BACTERIA RARE 06/19/2016    RBCUA 6 08/16/2020    BLOODU TRACE 08/16/2020    SPECGRAV >1.030 08/16/2020    GLUCOSEU Negative 08/16/2020    GLUCOSEU NEGATIVE 12/08/2009       Radiology:  XR CHEST PORTABLE   Final Result   Stable pulmonary hyperinflation. No acute pulmonary process.                  Assessment/Plan:    Active Hospital Problems    Diagnosis    Celiac artery stenosis (HCC) [I77.4]     Priority: High    Chest pain [R07.9]    PUD (peptic ulcer disease) [K27.9]     Abd pain nausea and vomiting unclear etiology   - known hx of PUD and celiac artery stenosis. - CTA of abd suggested moderate stenosis of celiac artery. - Vascular surgery was consulted, scans reviewed pt \" no signs of mesenteric ischemia SMA and SARAH BETH patent \" no further testing needed at this laurent   - GI consult possible  gastric emptying study  - add reglan     Constipation: lg stool burden seen on CT suspect contributing to clinical picture   - given supp no BM for 2 days per pt   - aggressive bowel reg    PUD: continue PPI     Pericardial effusion / CAD / chest pain. Patient with trace pericardial effusion on previous echo. Moderate pericardial effusion noted on CT scan. Discussed with cardiology, CT scan not reliable for measuring pericardial effusion. No evidence of pericarditis, no indication for echo. Cardiology has signed off. Continue Plavix. Back pain. CT showed advanced degenerative changes in lumbar spine but no acute fracture. Add lidocaine patch. May also use heat or ice to back. Hypothyroidism. S/p thyroid radiation ablation 2018. Has not followed with endocrinology or levothyroxine. TSH 44, Free T4 0.2. Begin levothyroxine 50 mcg daily. Urinary frequency. Patient afebrile. No leukocytosis. Check UA negative- suspect 2/2 to constipation     HTN: continue home meds     Tobacco use. Encouraged cessation. Nicotine patch. Bradycardia: suspect 2/2 to BB will stop as pt says she does not take this am home.    - restart ACE            DVT Prophylaxis: lovenox   Diet: DIET RENAL;  Code Status: Full Code    PT/OT Eval Status: na    Dispo - possible tomorrow     STEPHANIE Nagy - CNP

## 2020-08-18 NOTE — CONSULTS
Gastroenterology Consult Note      Patient: Yasmeen Foreman  : 1965  Acct#:      Date:  2020    Subjective:       History of Present Illness  Patient is a 47 y.o.  female admitted with Chest pain [R07.9]  Chest pain [R07.9] who is seen in consult for N/V, abdominal pain, h/o duodenal stenosis. H/o CAD on plavix. H/o duodenal ulcerations and duodenal stenosis in . H/o celiac artery stenosis. H/o intermittent epigastric pain. She began having epigastric pain with radiation into the back and neck 5 days ago. The pain was constant and strong. It was worse with eating and exertion. Has had associated nausea and vomiting. Was admitted on  but then left AMA. Came back then next day for the same symptoms. Dx with pericardial effusion which is chronic and stable per cardiology eval. Chest pain is noncardiac per cardio eval. Pt reports still having epigastric pain but ate most of her breakfast. Some nausea but no further vomiting. Occasionally takes ibuprofen. Takes daily protonix.          Past Medical History:   Diagnosis Date    Candida esophagitis ([address] 2018    On EGD    Celiac artery stenosis (HCC)     Childhood asthma     Chronic midline low back pain without sciatica     S/P Laminectomy 2004    Chronic migraine w/o aura w/o status migrainosus, not intractable     Coronary artery disease involving native coronary artery of native heart without angina pectoris 2014    S/P Angioplasty and Stent x 1 / Dr. Jana Dyson Duodenal stenosis 7-28-15    Dilated with balloon per Dr. Radha Houston hypertension     Hiatal hernia 2018    On EGD    Hypercholesterolemia     Hyperthyroidism 2017    Movement disorder     PUD (peptic ulcer disease)     Subclavian artery stenosis ([address]     Tobacco abuse       Past Surgical History:   Procedure Laterality Date    BACK SURGERY      x 2    CARDIAC SURGERY      COLONOSCOPY      HYSTERECTOMY  1994    Partial    LAPAROSCOPY  1992    LAPAROTOMY  1993    Lysis of Adhesion    LUMBAR LAMINECTOMY  2004 / 2008    With fusion in 2008   [address] [facility] GASTROINTESTINAL ENDOSCOPY  6/1/15    with biopsies and brushings     UPPER GASTROINTESTINAL ENDOSCOPY  7/28/15    UPPER GASTROINTESTINAL ENDOSCOPY  4/25/16    gastric biopsy, duodenal stenosis dilation     VASCULAR SURGERY  01/2015    Subclavian Artery Stent    WISDOM TOOTH EXTRACTION        Past Endoscopic History  ECG and colonoscopy with Dr Juanjo Ortiz 1/2018 for abdominal pain  Findings:  · Whitish satellite like I did not lesions throughout the esophagus more prominent in the proximal esophagus. This concerns for Candida esophagitis. Biopsies were obtained with cold biopsy forceps. · Small hiatal hernia, less than 2 cm size. · Widely patent peptic stricture could not be entirely ruled out due to mildly tortuous distal esophagus. · Normal gastric mucosa. · Normal duodenum including D1 and D2. Complications: None; patient tolerated the procedure well. Disposition: Transfer to floor    Condition: stable    Impression:   · Candida esophagitis  · Hiatal hernia  · Possible peptic stricture. Recommendations:  · Fluconazole therapy for Candida esophagitis  · Await biopsy results  · EGD and dilation as needed for dysphagia.      FINDINGS:  Bowel Preparation: Right colon:1 Transverse colon: 1 Left Colon: 1 Total BBPS: 3   Normal terminal ileum. Normal colon exam limited by quality of bowel preparation. Random colon biopsies are obtained with cold biopsy forceps. FINAL DIAGNOSES:  · Normal colon exam limited by quality of bowel preparation. · Unexplained lower abdominal pain. RECOMMENDATION:   · Exam is not sufficient from a screening perspective. · Await biopsy results. · Recommend screening colonoscopy in 1 year. EGD with Dr Eva Meraz 4/2016 for epigastric pain  IMPRESSION:  1.   Chronic-appearing mild stenosis of the junction of the duodenal bulb and  2nd portion of the duodenum. There is no active ulceration. There is no  active inflammation. I doubt this is contributing to her acute-onset pain. I did dilate it and biopsy it today. 2.  Minimal antral erythema. This is not enough to explain her pain either. I did biopsy this to rule out Helicobacter pylori. Admission Meds  No current facility-administered medications on file prior to encounter. Current Outpatient Medications on File Prior to Encounter   Medication Sig Dispense Refill    pantoprazole (PROTONIX) 40 MG tablet TAKE 1 TABLET BY MOUTH DAILY 30 tablet 1    ondansetron (ZOFRAN ODT) 4 MG disintegrating tablet Take 1-2 tablets by mouth every 8 hours as needed for Nausea May substitute the non ODT tablets if not covered financially by the insurance plan. 12 tablet 0    gabapentin (NEURONTIN) 600 MG tablet       metoprolol succinate (TOPROL XL) 50 MG extended release tablet Take 50 mg by mouth daily      promethazine (PHENERGAN) 25 MG tablet Take 25 mg by mouth every 6 hours as needed for Nausea (Nausea due to migraines)      clopidogrel (PLAVIX) 75 MG tablet Take 1 tablet by mouth daily 30 tablet 5    SUMAtriptan (IMITREX) 100 MG tablet TAKE ONE BY MOUTH AT ONSET OF MIGRAINE. MAY REPEAT ONCE IN 2 HRS. MAX OF 2 PER DAY.  9 tablet 5            Allergies  Allergies   Allergen Reactions    Iodine Swelling     Throat swelling    Penicillins Rash      Social   Social History     Tobacco Use    Smoking status: Current Every Day Smoker     Packs/day: 0.50     Years: 10.00     Pack years: 5.00     Types: Cigarettes    Smokeless tobacco: Never Used   Substance Use Topics    Alcohol use: No        Family History   Problem Relation Age of Onset    Cancer Mother         Breast     High Blood Pressure Father     Heart Disease Father         MI    Diabetes Paternal Aunt     Asthma Son     Asthma Daughter       Review of Systems  Constitutional: negative for fevers, chills, sweats    Ears, nose, mouth, throat, and face: negative for nasal congestion and sore throat   Respiratory: negative for cough and shortness of breath   Cardiovascular: negative for chest pain and dyspnea   Gastrointestinal: see hpi   Genitourinary:negative for dysuria and frequency   Integument/breast: negative for pruritus and rash   Hematologic/lymphatic: negative for bleeding and easy bruising   Musculoskeletal:negative for arthralgias and myalgias   Neurological: negative for dizziness and weakness         Physical Exam  Blood pressure (!) 144/79, pulse 75, temperature 98.4 °F (36.9 °C), temperature source Oral, resp. rate 16, height 5' 3\" (1.6 m), weight 119 lb (54 kg), SpO2 96 %. General appearance: alert, cooperative, no distress, appears stated age  Eyes: Anicteric  Head: Normocephalic, without obvious abnormality  Lungs: clear to auscultation bilaterally, Normal Effort  Heart: regular rate and rhythm, normal S1 and S2, no murmurs or rubs  Abdomen: soft, mild epigastric tenderness. Bowel sounds normal. No masses,  no organomegaly. Extremities: atraumatic, no cyanosis or edema  Skin: warm and dry, no jaundice  Neuro: Grossly intact, A&OX3  Musculoskeletal: 5/5  strength BUE      Data Review:    Recent Labs     08/15/20  1429 08/17/20  1253 08/18/20  0811   WBC 8.0 5.7 6.2   HGB 12.1 13.1 13.8   HCT 34.9* 38.2 41.1   .6* 101.1* 101.5*    221 232     Recent Labs     08/15/20  1429 08/17/20  1253 08/18/20  0811   * 133* 134*   K 3.7 3.3* 3.2*    97* 95*   CO2 24 21 24   BUN 19 18 10   CREATININE 1.0 0.7 0.8     No results for input(s): AST, ALT, ALB, BILIDIR, BILITOT, ALKPHOS in the last 72 hours. No results for input(s): LIPASE, AMYLASE in the last 72 hours. Recent Labs     08/15/20  1429   PROTIME 11.7   INR 1.01     No results for input(s): PTT in the last 72 hours.   No results for input(s): OCCULTBLD in the last 72 of care. In summary, my findings and plan are the following: As above, 48 yo woman with epig pain associated with intractable N/V prompting admission. Has had similar episode in past.  Abd imaging concerning for celiac art stenosis, but symptoms episodic and not constant. She has evidence of constipation as well on CT. On exam her abd is soft and non tender with normal BS. We will eval her pain with RUQ US and labwork to r/o pancreatobiliary sources. Will also plan EGD r/o PUD or other UGI pathology. Trial of MgCitrate today for catharsis.     Tank Bach MD  [address]  8/18/2020

## 2020-08-18 NOTE — PLAN OF CARE
Problem: Pain:  Goal: Control of acute pain  Description: Control of acute pain  Outcome: Ongoing  Goal: Control of chronic pain  Description: Control of chronic pain  Outcome: Ongoing    Gave 2mg IV morphine at 2313 for pt c/o pain;see MAR & all flowsheets. Will continue to monitor.

## 2020-08-18 NOTE — PROGRESS NOTES
mg Intravenous Q6H    ramipril  10 mg Oral Daily    polyethylene glycol  17 g Oral Daily    lidocaine  1 patch Transdermal Daily    glycerin  1 suppository Rectal Once    levothyroxine  50 mcg Oral Daily    nicotine  1 patch Transdermal Daily    clopidogrel  75 mg Oral Daily    gabapentin  800 mg Oral TID    pantoprazole  40 mg Oral QAM AC    sodium chloride flush  10 mL Intravenous 2 times per day    enoxaparin  40 mg Subcutaneous Daily     Continuous Infusions:    sodium chloride 100 mL/hr at 08/18/20 0454         Assessment:   Celiac artery stenosis - patent SMA and SARAH BETH, no clinical signs of mesenteric ischemia  Abdominal pain, N/V - vomiting improved, feels a little better today but still with nausea and mild abdominal pain   Chest pain  Pericardial effusion   Hypertension  H/o PUD s/p dilation 2016  Tobacco use     Plan:  GI consulted, planning for EGD tomorrow  Continue Aspirin 81 mg daily  Bowel regimen   Smoking cessation  Will continue to follow      Patient educated on plan of care and disease process. All questions answered.         Electronically signed by STEPHANIE Boykin CNP on 8/18/2020 at 11:58 AM

## 2020-08-19 ENCOUNTER — ANESTHESIA EVENT (OUTPATIENT)
Dept: ENDOSCOPY | Age: 55
End: 2020-08-19
Payer: MEDICARE

## 2020-08-19 ENCOUNTER — ANESTHESIA (OUTPATIENT)
Dept: ENDOSCOPY | Age: 55
End: 2020-08-19
Payer: MEDICARE

## 2020-08-19 ENCOUNTER — APPOINTMENT (OUTPATIENT)
Dept: ULTRASOUND IMAGING | Age: 55
End: 2020-08-19
Payer: MEDICARE

## 2020-08-19 VITALS
DIASTOLIC BLOOD PRESSURE: 75 MMHG | HEART RATE: 82 BPM | RESPIRATION RATE: 16 BRPM | SYSTOLIC BLOOD PRESSURE: 160 MMHG | TEMPERATURE: 97.5 F | HEIGHT: 63 IN | OXYGEN SATURATION: 93 % | WEIGHT: 120.2 LBS | BODY MASS INDEX: 21.3 KG/M2

## 2020-08-19 VITALS — SYSTOLIC BLOOD PRESSURE: 123 MMHG | OXYGEN SATURATION: 99 % | DIASTOLIC BLOOD PRESSURE: 85 MMHG

## 2020-08-19 PROCEDURE — 6370000000 HC RX 637 (ALT 250 FOR IP): Performed by: NURSE PRACTITIONER

## 2020-08-19 PROCEDURE — APPSS30 APP SPLIT SHARED TIME 16-30 MINUTES: Performed by: NURSE PRACTITIONER

## 2020-08-19 PROCEDURE — 6370000000 HC RX 637 (ALT 250 FOR IP): Performed by: INTERNAL MEDICINE

## 2020-08-19 PROCEDURE — 2500000003 HC RX 250 WO HCPCS: Performed by: INTERNAL MEDICINE

## 2020-08-19 PROCEDURE — 6360000002 HC RX W HCPCS: Performed by: FAMILY MEDICINE

## 2020-08-19 PROCEDURE — 6360000002 HC RX W HCPCS: Performed by: NURSE ANESTHETIST, CERTIFIED REGISTERED

## 2020-08-19 PROCEDURE — 88305 TISSUE EXAM BY PATHOLOGIST: CPT

## 2020-08-19 PROCEDURE — 2500000003 HC RX 250 WO HCPCS: Performed by: NURSE ANESTHETIST, CERTIFIED REGISTERED

## 2020-08-19 PROCEDURE — 3609012400 HC EGD TRANSORAL BIOPSY SINGLE/MULTIPLE: Performed by: INTERNAL MEDICINE

## 2020-08-19 PROCEDURE — 6360000002 HC RX W HCPCS: Performed by: INTERNAL MEDICINE

## 2020-08-19 PROCEDURE — 7100000000 HC PACU RECOVERY - FIRST 15 MIN: Performed by: INTERNAL MEDICINE

## 2020-08-19 PROCEDURE — 6370000000 HC RX 637 (ALT 250 FOR IP): Performed by: FAMILY MEDICINE

## 2020-08-19 PROCEDURE — 76705 ECHO EXAM OF ABDOMEN: CPT

## 2020-08-19 PROCEDURE — 2709999900 HC NON-CHARGEABLE SUPPLY: Performed by: INTERNAL MEDICINE

## 2020-08-19 PROCEDURE — 3700000001 HC ADD 15 MINUTES (ANESTHESIA): Performed by: INTERNAL MEDICINE

## 2020-08-19 PROCEDURE — 2580000003 HC RX 258: Performed by: ANESTHESIOLOGY

## 2020-08-19 PROCEDURE — G0378 HOSPITAL OBSERVATION PER HR: HCPCS

## 2020-08-19 PROCEDURE — 7100000001 HC PACU RECOVERY - ADDTL 15 MIN: Performed by: INTERNAL MEDICINE

## 2020-08-19 PROCEDURE — 2580000003 HC RX 258: Performed by: INTERNAL MEDICINE

## 2020-08-19 PROCEDURE — 96376 TX/PRO/DX INJ SAME DRUG ADON: CPT

## 2020-08-19 PROCEDURE — APPNB30 APP NON BILLABLE TIME 0-30 MINS: Performed by: NURSE PRACTITIONER

## 2020-08-19 PROCEDURE — 2580000003 HC RX 258: Performed by: NURSE ANESTHETIST, CERTIFIED REGISTERED

## 2020-08-19 PROCEDURE — 6360000002 HC RX W HCPCS: Performed by: NURSE PRACTITIONER

## 2020-08-19 PROCEDURE — 3700000000 HC ANESTHESIA ATTENDED CARE: Performed by: INTERNAL MEDICINE

## 2020-08-19 RX ORDER — LIDOCAINE HYDROCHLORIDE 20 MG/ML
INJECTION, SOLUTION INFILTRATION; PERINEURAL PRN
Status: DISCONTINUED | OUTPATIENT
Start: 2020-08-19 | End: 2020-08-19 | Stop reason: SDUPTHER

## 2020-08-19 RX ORDER — SODIUM CHLORIDE 9 MG/ML
INJECTION, SOLUTION INTRAVENOUS CONTINUOUS PRN
Status: DISCONTINUED | OUTPATIENT
Start: 2020-08-19 | End: 2020-08-19 | Stop reason: SDUPTHER

## 2020-08-19 RX ORDER — PANTOPRAZOLE SODIUM 40 MG/1
40 TABLET, DELAYED RELEASE ORAL
Status: DISCONTINUED | OUTPATIENT
Start: 2020-08-19 | End: 2020-08-19 | Stop reason: HOSPADM

## 2020-08-19 RX ORDER — HYDROCODONE BITARTRATE AND ACETAMINOPHEN 5; 325 MG/1; MG/1
1 TABLET ORAL
Status: DISCONTINUED | OUTPATIENT
Start: 2020-08-19 | End: 2020-08-19 | Stop reason: HOSPADM

## 2020-08-19 RX ORDER — HYDROMORPHONE HCL 110MG/55ML
0.5 PATIENT CONTROLLED ANALGESIA SYRINGE INTRAVENOUS EVERY 5 MIN PRN
Status: DISCONTINUED | OUTPATIENT
Start: 2020-08-19 | End: 2020-08-19 | Stop reason: HOSPADM

## 2020-08-19 RX ORDER — LEVOTHYROXINE SODIUM 0.05 MG/1
50 TABLET ORAL DAILY
Qty: 30 TABLET | Refills: 3 | Status: SHIPPED | OUTPATIENT
Start: 2020-08-20

## 2020-08-19 RX ORDER — METOCLOPRAMIDE 10 MG/1
10 TABLET ORAL
Qty: 40 TABLET | Refills: 0 | Status: SHIPPED | OUTPATIENT
Start: 2020-08-19 | End: 2020-08-29

## 2020-08-19 RX ORDER — PANTOPRAZOLE SODIUM 40 MG/1
40 TABLET, DELAYED RELEASE ORAL
Qty: 30 TABLET | Refills: 3 | Status: SHIPPED | OUTPATIENT
Start: 2020-08-19

## 2020-08-19 RX ORDER — LIDOCAINE HYDROCHLORIDE 10 MG/ML
1 INJECTION, SOLUTION EPIDURAL; INFILTRATION; INTRACAUDAL; PERINEURAL
Status: DISCONTINUED | OUTPATIENT
Start: 2020-08-19 | End: 2020-08-19 | Stop reason: HOSPADM

## 2020-08-19 RX ORDER — HYDROMORPHONE HCL 110MG/55ML
0.25 PATIENT CONTROLLED ANALGESIA SYRINGE INTRAVENOUS EVERY 5 MIN PRN
Status: DISCONTINUED | OUTPATIENT
Start: 2020-08-19 | End: 2020-08-19 | Stop reason: HOSPADM

## 2020-08-19 RX ORDER — RAMIPRIL 10 MG/1
10 CAPSULE ORAL DAILY
Qty: 30 CAPSULE | Refills: 3 | Status: SHIPPED | OUTPATIENT
Start: 2020-08-20

## 2020-08-19 RX ORDER — ONDANSETRON 2 MG/ML
4 INJECTION INTRAMUSCULAR; INTRAVENOUS
Status: DISCONTINUED | OUTPATIENT
Start: 2020-08-19 | End: 2020-08-19 | Stop reason: HOSPADM

## 2020-08-19 RX ORDER — ONDANSETRON 2 MG/ML
INJECTION INTRAMUSCULAR; INTRAVENOUS PRN
Status: DISCONTINUED | OUTPATIENT
Start: 2020-08-19 | End: 2020-08-19 | Stop reason: SDUPTHER

## 2020-08-19 RX ORDER — PROPOFOL 10 MG/ML
INJECTION, EMULSION INTRAVENOUS PRN
Status: DISCONTINUED | OUTPATIENT
Start: 2020-08-19 | End: 2020-08-19 | Stop reason: SDUPTHER

## 2020-08-19 RX ORDER — SODIUM CHLORIDE 9 MG/ML
INJECTION, SOLUTION INTRAVENOUS CONTINUOUS
Status: DISCONTINUED | OUTPATIENT
Start: 2020-08-19 | End: 2020-08-19

## 2020-08-19 RX ORDER — DEXAMETHASONE SODIUM PHOSPHATE 4 MG/ML
INJECTION, SOLUTION INTRA-ARTICULAR; INTRALESIONAL; INTRAMUSCULAR; INTRAVENOUS; SOFT TISSUE PRN
Status: DISCONTINUED | OUTPATIENT
Start: 2020-08-19 | End: 2020-08-19 | Stop reason: SDUPTHER

## 2020-08-19 RX ORDER — TRAMADOL HYDROCHLORIDE 50 MG/1
50 TABLET ORAL EVERY 4 HOURS PRN
Qty: 10 TABLET | Refills: 0 | Status: SHIPPED | OUTPATIENT
Start: 2020-08-19 | End: 2020-08-22

## 2020-08-19 RX ORDER — PROPOFOL 10 MG/ML
INJECTION, EMULSION INTRAVENOUS CONTINUOUS PRN
Status: DISCONTINUED | OUTPATIENT
Start: 2020-08-19 | End: 2020-08-19 | Stop reason: SDUPTHER

## 2020-08-19 RX ORDER — POLYETHYLENE GLYCOL 3350 17 G/17G
17 POWDER, FOR SOLUTION ORAL DAILY PRN
Qty: 527 G | Refills: 1 | COMMUNITY
Start: 2020-08-19 | End: 2020-09-18

## 2020-08-19 RX ADMIN — ONDANSETRON 4 MG: 2 INJECTION INTRAMUSCULAR; INTRAVENOUS at 08:37

## 2020-08-19 RX ADMIN — PANTOPRAZOLE SODIUM 40 MG: 40 TABLET, DELAYED RELEASE ORAL at 06:33

## 2020-08-19 RX ADMIN — SODIUM CHLORIDE: 9 INJECTION, SOLUTION INTRAVENOUS at 07:55

## 2020-08-19 RX ADMIN — METOCLOPRAMIDE HYDROCHLORIDE 10 MG: 5 INJECTION INTRAMUSCULAR; INTRAVENOUS at 10:38

## 2020-08-19 RX ADMIN — GABAPENTIN 800 MG: 400 CAPSULE ORAL at 10:38

## 2020-08-19 RX ADMIN — LEVOTHYROXINE SODIUM 50 MCG: 0.03 TABLET ORAL at 06:33

## 2020-08-19 RX ADMIN — RAMIPRIL 10 MG: 5 CAPSULE ORAL at 10:38

## 2020-08-19 RX ADMIN — METOCLOPRAMIDE HYDROCHLORIDE 10 MG: 5 INJECTION INTRAMUSCULAR; INTRAVENOUS at 05:16

## 2020-08-19 RX ADMIN — LIDOCAINE HYDROCHLORIDE 60 MG: 20 INJECTION, SOLUTION INFILTRATION; PERINEURAL at 08:38

## 2020-08-19 RX ADMIN — PROPOFOL 140 MCG/KG/MIN: 10 INJECTION, EMULSION INTRAVENOUS at 08:38

## 2020-08-19 RX ADMIN — PROPOFOL 60 MG: 10 INJECTION, EMULSION INTRAVENOUS at 08:38

## 2020-08-19 RX ADMIN — CLOPIDOGREL 75 MG: 75 TABLET, FILM COATED ORAL at 10:38

## 2020-08-19 RX ADMIN — POLYETHYLENE GLYCOL 3350 17 G: 17 POWDER, FOR SOLUTION ORAL at 10:39

## 2020-08-19 RX ADMIN — MORPHINE SULFATE 2 MG: 2 INJECTION, SOLUTION INTRAMUSCULAR; INTRAVENOUS at 10:38

## 2020-08-19 RX ADMIN — SODIUM CHLORIDE: 9 INJECTION, SOLUTION INTRAVENOUS at 08:31

## 2020-08-19 RX ADMIN — Medication 10 ML: at 10:39

## 2020-08-19 RX ADMIN — MORPHINE SULFATE 2 MG: 2 INJECTION, SOLUTION INTRAMUSCULAR; INTRAVENOUS at 02:28

## 2020-08-19 RX ADMIN — DEXAMETHASONE SODIUM PHOSPHATE 4 MG: 4 INJECTION, SOLUTION INTRAMUSCULAR; INTRAVENOUS at 08:37

## 2020-08-19 RX ADMIN — MORPHINE SULFATE 2 MG: 2 INJECTION, SOLUTION INTRAMUSCULAR; INTRAVENOUS at 06:33

## 2020-08-19 ASSESSMENT — PULMONARY FUNCTION TESTS
PIF_VALUE: 0

## 2020-08-19 ASSESSMENT — PAIN SCALES - GENERAL
PAINLEVEL_OUTOF10: 10
PAINLEVEL_OUTOF10: 8
PAINLEVEL_OUTOF10: 8
PAINLEVEL_OUTOF10: 9
PAINLEVEL_OUTOF10: 9

## 2020-08-19 ASSESSMENT — LIFESTYLE VARIABLES: SMOKING_STATUS: 1

## 2020-08-19 NOTE — ANESTHESIA POSTPROCEDURE EVALUATION
Department of Anesthesiology  Postprocedure Note    Patient: Ortega Piedra  MRN: [id number]  YOB: 1965  Date of evaluation: 8/19/2020  Time:  11:58 AM     Procedure Summary     Date:  08/19/20 Room / Location:  [address] [place] [address]    Anesthesia Start:  0830 Anesthesia Stop:  2913    Procedure:  EGD BIOPSY (N/A Abdomen) Diagnosis:  (Nausea/Vomiting/Abdominal Pain)    Surgeon:  Willie Contreras MD Responsible Provider:  Kemi Boyle MD    Anesthesia Type:  TIVA ASA Status:  3          Anesthesia Type: TIVA    Carmel Phase I: Carmel Score: 10    Carmel Phase II:      Last vitals: Reviewed and per EMR flowsheets.        Anesthesia Post Evaluation    Patient location during evaluation: PACU  Patient participation: complete - patient participated  Level of consciousness: awake  Airway patency: patent  Nausea & Vomiting: no vomiting  Complications: no  Cardiovascular status: hemodynamically stable  Respiratory status: acceptable  Hydration status: euvolemic

## 2020-08-19 NOTE — FLOWSHEET NOTE
Patient returned to room 3305 from endo. Report received from endo RN. Patient to stay NPO for ultrasound, patient aware. AOx4, complaining of abdominal pain.       08/19/20 0930   Vital Signs   Temp 97.5 °F (36.4 °C)   Temp Source Temporal   Pulse 82   Heart Rate Source Monitor   Resp 16   BP (!) 160/75   BP Location Left upper arm   BP Upper/Lower Upper   Patient Position Sitting   Level of Consciousness 0   MEWS Score 1   Oxygen Therapy   SpO2 93 %   O2 Device None (Room air)

## 2020-08-19 NOTE — PROGRESS NOTES
Data- discharge order received, pt verbalized agreement to discharge, disposition to previous residence, no needs for HHC/DME. Action- discharge instructions prepared and given to patient, pt verbalized understanding. Medication information packet given r/t NEW and/or CHANGED prescriptions emphasizing name/purpose/side effects, pt verbalized understanding. Discharge instruction summary: Diet- cardiac, Activity- as tolerated, Primary Care Physician as follows: [address] [place] [address] [phone] f/u appointment within 1 week, immunizations reviewed and updated, prescription medications filled by patient. Inpatient surgical procedure precautions reviewed: EGD. Response- Pt belongings gathered, IV removed. Disposition is home (no HHC/DME needs), transported with , taken to lobby via w/c w/ discharge lounge, no complications.

## 2020-08-19 NOTE — PROGRESS NOTES
kg)   SpO2 96%   BMI 21.29 kg/m²     General appearance: ill appearing older than chonological age   [de-identified]: Pupils equal, round, and reactive to light. Conjunctivae/corneas clear. Neck: Supple, with full range of motion. No jugular venous distention. Trachea midline. Respiratory:  Normal respiratory effort. Clear to auscultation, bilaterally without Rales/Wheezes/Rhonchi. Cardiovascular: Regular rate and rhythm with normal S1/S2 without murmurs, rubs or gallops. Abdomen: Soft, tender, non-distended with normal bowel sounds. Musculoskeletal: No clubbing, cyanosis or edema bilaterally. Full range of motion without deformity. Skin: Skin color, texture, turgor normal.  No rashes or lesions. Neurologic:  Neurovascularly intact without any focal sensory/motor deficits. Cranial nerves: II-XII intact, grossly non-focal.  Psychiatric: Alert and oriented, thought content appropriate, normal insight  Capillary Refill: Brisk,< 3 seconds   Peripheral Pulses: +2 palpable, equal bilaterally       Labs:   Recent Labs     08/17/20  1253 08/18/20  0811   WBC 5.7 6.2   HGB 13.1 13.8   HCT 38.2 41.1    232     Recent Labs     08/17/20  1253 08/18/20  0811   * 134*   K 3.3* 3.2*   CL 97* 95*   CO2 21 24   BUN 18 10   CREATININE 0.7 0.8   CALCIUM 9.8 9.1     Recent Labs     08/18/20  0810   AST 25   ALT <5*   BILIDIR <0.2   BILITOT 0.6   ALKPHOS 59     No results for input(s): INR in the last 72 hours. No results for input(s): Patrice Shown in the last 72 hours. Urinalysis:      Lab Results   Component Value Date    NITRU Negative 08/16/2020    WBCUA 2 08/16/2020    BACTERIA RARE 06/19/2016    RBCUA 6 08/16/2020    BLOODU TRACE 08/16/2020    SPECGRAV >1.030 08/16/2020    GLUCOSEU Negative 08/16/2020    GLUCOSEU NEGATIVE 12/08/2009       Radiology:  XR CHEST PORTABLE   Final Result   Stable pulmonary hyperinflation. No acute pulmonary process.          US GALLBLADDER RUQ    (Results Pending) Assessment/Plan:    Active Hospital Problems    Diagnosis    Celiac artery stenosis (HCC) [I77.4]     Priority: High    Chest pain [R07.9]    PUD (peptic ulcer disease) [K27.9]     Abd pain nausea and vomiting unclear etiology   - known hx of PUD and celiac artery stenosis. - CTA of abd suggested moderate stenosis of celiac artery. - Vascular surgery was consulted, scans reviewed pt \" no signs of mesenteric ischemia SMA and SARAH BETH patent \" no further testing needed at this laurent   - GI consult  - added reglan   - RUQ US - pending   - 8/19 EGD - Bx obtained can follow up with GI as OP     Constipation: lg stool burden seen on CT suspect contributing to clinical picture   - given supp no BM for 2 days per pt   - aggressive bowel reg  - 8/19 small BM     PUD: continue PPI     Pericardial effusion / CAD / chest pain. Patient with trace pericardial effusion on previous echo. Moderate pericardial effusion noted on CT scan. Discussed with cardiology, CT scan not reliable for measuring pericardial effusion. No evidence of pericarditis, no indication for echo. Cardiology has signed off. Continue Plavix. Back pain. CT showed advanced degenerative changes in lumbar spine but no acute fracture. Add lidocaine patch. May also use heat or ice to back. Hypothyroidism. S/p thyroid radiation ablation 2018. Has not followed with endocrinology or levothyroxine. TSH 44, Free T4 0.2. Begin levothyroxine 50 mcg daily. Urinary frequency. Patient afebrile. No leukocytosis. Check UA negative- suspect 2/2 to constipation     HTN: continue home meds     Tobacco use. Encouraged cessation. Nicotine patch. Bradycardia: suspect 2/2 to BB will stop as pt says she does not take this am home.    - restart ACE            DVT Prophylaxis: lovenox   Diet: Diet NPO, After Midnight  Code Status: Full Code    PT/OT Eval Status: na    Dispo - possibly today if tolerating diet and ADRIAN can be obtained    STEPHANIE Ibrahim - CNP

## 2020-08-19 NOTE — BRIEF OP NOTE
Brief Postoperative Note - Full Note in Chart Review/Procedures tab       Patient: Jessee Markham  YOB: 1965  MRN: [id number]    Date of Procedure: 8/19/2020    Pre-Op Diagnosis: Nausea/Vomiting/Abdominal Pain    Post-Op Diagnosis: Same       Procedure(s):  EGD BIOPSY    Surgeon(s):  Viviana Patrick MD    Assistant:  * No surgical staff found *    Anesthesia: Monitor Anesthesia Care    Estimated Blood Loss (mL): Minimal    Complications: None    Specimens:   ID Type Source Tests Collected by Time Destination   A : Bx GEJ Tissue Tissue SURGICAL PATHOLOGY Viviana Patrick MD 8/19/2020 3975        Implants:  * No implants in log *      Drains: * No LDAs found *    Findings:  1) R/o short-segment Coker's - biopsies obtained. 2) Scarred distal duodenal bulb    3) Otherwise normal EGD    Rec:  1) RUQ US to r/o gallstone disease. 2) Cardiac diet after US  3) Increase Protonix 40 mg to BID, ongoing. 4) OK to discharge when tolerating diet. I am happy to see in f/u as outpatient in 3 months.       Electronically signed by Viviana Patrick MD on 8/19/2020 at 8:53 AM

## 2020-08-19 NOTE — PROGRESS NOTES
Vascular Progress Note    8/19/2020 11:25 AM    Chief complaint / Reason for visit : celiac artery stenosis    Subjective:  Patient reports she is doing better. She is complaining of upper abdominal pain that is constant. She reports nausea is better and denies any vomiting. She ate a small amount of breakfast and denies any increased pain or nausea at this time. She had EGD and RUQ ultrasound this morning. Appears hemodynamically stable, afebrile.      Vital Signs: BP (!) 160/75   Pulse 82   Temp 97.5 °F (36.4 °C) (Temporal)   Resp 16   Ht 5' 3\" (1.6 m)   Wt 120 lb 3.2 oz (54.5 kg)   SpO2 93%   BMI 21.29 kg/m²  O2 Flow Rate (L/min): 1 L/min   I/O:      Intake/Output Summary (Last 24 hours) at 8/19/2020 1125  Last data filed at 8/19/2020 0920  Gross per 24 hour   Intake 760 ml   Output --   Net 760 ml       Physical Exam:   General: no apparent distress, appears stated age  Chest/Lungs: clear to auscultation bilaterally, no accessory muscle use  Cardiac:  regular rate and rhythm, S1, S2 normal, no murmur, click, rub or gallop  Abdomen:  abdomen is soft, +bowel sounds, mild upper abdominal tenderness with palpation  Vascular:  radial pulses normal  Extremities: warm and well perfused, no signs of cyanosis or ischemia, no significant edema, bilateral upper and lower extremity motorsensory intact    Labs:   Lab Results   Component Value Date     08/18/2020    K 3.2 08/18/2020    CL 95 08/18/2020    CO2 24 08/18/2020    BUN 10 08/18/2020    CREATININE 0.8 08/18/2020    GFRAA >60 08/18/2020    GFRAA >60 12/11/2009    LABGLOM >60 08/18/2020    GLUCOSE 112 08/18/2020    PHOS 2.9 04/28/2016    MG 2.20 08/18/2020    CALCIUM 9.1 08/18/2020     Lab Results   Component Value Date    WBC 6.2 08/18/2020    RBC 4.05 08/18/2020    HGB 13.8 08/18/2020    HCT 41.1 08/18/2020    .5 08/18/2020    RDW 15.7 08/18/2020     08/18/2020     Lab Results   Component Value Date    INR 1.01 08/15/2020    PROTIME 11.7 08/15/2020        Imaging:    EGD 8/19/20:  Findings:           1) R/o short-segment Coker's - biopsies obtained. 2) Scarred distal duodenal bulb                              3) Otherwise normal EGD    RUQ ultrasound 8/19/20:  Impression    1. Unremarkable right upper quadrant ultrasound. 2. Incidental small pericardial effusion. Scheduled Meds:    pantoprazole  40 mg Oral BID AC    metoclopramide  10 mg Intravenous Q6H    ramipril  10 mg Oral Daily    polyethylene glycol  17 g Oral Daily    lidocaine  1 patch Transdermal Daily    glycerin  1 suppository Rectal Once    levothyroxine  50 mcg Oral Daily    nicotine  1 patch Transdermal Daily    clopidogrel  75 mg Oral Daily    gabapentin  800 mg Oral TID    sodium chloride flush  10 mL Intravenous 2 times per day    enoxaparin  40 mg Subcutaneous Daily     Continuous Infusions:    sodium chloride 100 mL/hr at 08/18/20 2321         Assessment:   Celiac artery stenosis - patent SMA and SARAH BETH, no clinical signs of mesenteric ischemia  Abdominal pain, N/V -  N/V improved, still with upper abdominal pain   Chest pain  Pericardial effusion   Hypertension  H/o PUD s/p dilation 2016  Tobacco use     Plan:  GI following and EGD showed scarred distal duodenal bulb and biopsy taken to r/o short-segment Coker's. RUQ ultrasound was unremarkable. PPI increased and diet advanced per GI. Continue Aspirin 81 mg daily  Bowel regimen   Smoking cessation  Will continue to follow for resolution of symptoms         Patient educated on plan of care and disease process. All questions answered.         Electronically signed by STEPHANIE Benitez CNP on 8/19/2020 at 11:25 AM

## 2020-08-19 NOTE — PROGRESS NOTES
Shift assessment complete. VSS. Pt c/o pain to back, abdomen, and migraine. Pt requested home dose MD Dallin notified and stated med is contraindicated for CP and celiac artery stenosis. Pt c/o nausea, no vomiting. No BM yet, but passing gas and states \"she feels like BM is passing through\". Pt aware of POC. Will continue to monitor.

## 2020-08-19 NOTE — PLAN OF CARE
Problem: Falls - Risk of:  Goal: Will remain free from falls  Description: Will remain free from falls  8/18/2020 2244 by Keri Solis RN  Outcome: Ongoing  8/18/2020 1304 by Kirstie Calvo RN  Outcome: Ongoing  Note: Medium fall risk. Patient steady on feet, no need for bed or chair alarm. Non-skid footwear on patient, bed in lowest position, call light within reach. Problem: Pain:  Goal: Pain level will decrease  Description: Pain level will decrease  Outcome: Ongoing  Goal: Control of acute pain  Description: Control of acute pain  8/18/2020 2244 by Keri Solis RN  Outcome: Ongoing  8/18/2020 1304 by Kirstie Calvo RN  Outcome: Ongoing  Note: Patient with complaints of upper abdominal pain. IV pain medication PRN. GI consulted. Plan for gallbladder US tomorrow. Goal: Control of chronic pain  Description: Control of chronic pain  8/18/2020 1304 by Kirstie Calvo RN  Outcome: Ongoing  Note: Patient with continued lower back pain. Lidocaine patch applied to back.

## 2020-08-19 NOTE — ANESTHESIA PRE PROCEDURE
Department of Anesthesiology  Preprocedure Note       Name:  Cuauhtemoc Jain   Age:  47 y.o.  :  1965                                          MRN:  [id number]         Date:  2020      Surgeon: Magan Armando):  Dennise Hewitt MD    Procedure: Procedure(s):  EGD DIAGNOSTIC ONLY    Medications prior to admission:   Prior to Admission medications    Medication Sig Start Date End Date Taking? Authorizing Provider   pantoprazole (PROTONIX) 40 MG tablet TAKE 1 TABLET BY MOUTH DAILY 18  Yes Martin Sawyer DO   ondansetron (ZOFRAN ODT) 4 MG disintegrating tablet Take 1-2 tablets by mouth every 8 hours as needed for Nausea May substitute the non ODT tablets if not covered financially by the insurance plan. 18  Yes Sb Hernandez MD   gabapentin (NEURONTIN) 600 MG tablet  18  Yes Historical Provider, MD   metoprolol succinate (TOPROL XL) 50 MG extended release tablet Take 50 mg by mouth daily   Yes Historical Provider, MD   promethazine (PHENERGAN) 25 MG tablet Take 25 mg by mouth every 6 hours as needed for Nausea (Nausea due to migraines)   Yes Historical Provider, MD   clopidogrel (PLAVIX) 75 MG tablet Take 1 tablet by mouth daily 17  Yes Juan Carlos Blue DO   SUMAtriptan (IMITREX) 100 MG tablet TAKE ONE BY MOUTH AT ONSET OF MIGRAINE. MAY REPEAT ONCE IN 2 HRS.  MAX OF 2 PER DAY. 17  Yes Dejon Heaton DO       Current medications:    Current Facility-Administered Medications   Medication Dose Route Frequency Provider Last Rate Last Dose    HYDROcodone-acetaminophen (NORCO) 5-325 MG per tablet 1 tablet  1 tablet Oral Once PRN Carmelo Olguin MD        [facility] 4 mg  4 mg Intravenous Once PRN Carmelo Olguin MD        HYDROmorphone (DILAUDID) injection 0.25 mg  0.25 mg Intravenous Q5 Min PRN Carmelo Olguin MD        HYDROmorphone (DILAUDID) injection 0.5 mg  0.5 mg Intravenous Q5 Min PRN Carmelo Olguin MD        potassium chloride [facility]) extended release tablet 40 mEq  40 mEq Oral PRN STEPHANIE Martinez CNP   40 mEq at 08/18/20 1401    Or    potassium bicarb-citric acid (EFFER-K) effervescent tablet 40 mEq  40 mEq Oral PRN STEPHANIE Martinez CNP        Or    potassium chloride 10 mEq/100 mL IVPB (Peripheral Line)  10 mEq Intravenous PRN STEPHANIE Martinez - CNP        metoclopramide (REGLAN) injection 10 mg  10 mg Intravenous Q6H STEPHANIE Martinez - CNP   10 mg at 08/19/20 0516    ramipril (ALTACE) capsule 10 mg  10 mg Oral Daily STEPHANIE Martinez - CNP   10 mg at 08/18/20 0813    polyethylene glycol (GLYCOLAX) packet 17 g  17 g Oral Daily STEPHANIE Martinez - CNP   17 g at 08/18/20 0816    0.9 % sodium chloride infusion   Intravenous Continuous STEPHANIE Martinez  mL/hr at 08/18/20 2352      lidocaine 4 % external patch 1 patch  1 patch Transdermal Daily STEPHANIE Phelan - CNP   1 patch at 08/18/20 2637    glycerin suppository 2 g  1 suppository Rectal Once STEPHANIE Phelan - CNP   Stopped at 08/16/20 1901    levothyroxine (SYNTHROID) tablet 50 mcg  50 mcg Oral Daily STEPHANIE Phelan - CNP   50 mcg at 08/19/20 8614    nicotine (NICODERM CQ) 14 MG/24HR 1 patch  1 patch Transdermal Daily STEPHANIE Phelan - CNP   1 patch at 08/16/20 1710    clopidogrel (PLAVIX) tablet 75 mg  75 mg Oral Daily Antwan Lopes MD   75 mg at 08/18/20 0814    gabapentin (NEURONTIN) capsule 800 mg  800 mg Oral TID Antwan Lopes MD   800 mg at 08/18/20 2144    pantoprazole (PROTONIX) tablet 40 mg  40 mg Oral QAM AC Antwan Lopes MD   40 mg at 08/19/20 7434    sodium chloride flush 0.9 % injection 10 mL  10 mL Intravenous 2 times per day Antwan Lopes MD   10 mL at 08/18/20 2146    sodium chloride flush 0.9 % injection 10 mL  10 mL Intravenous PRN Antwan Lopes MD   10 mL at 08/17/20 2314    acetaminophen (TYLENOL) tablet 650 mg  650 mg Oral Q6H PRN Antwan Lopes MD   650 mg at 08/16/20 1230    Or    acetaminophen (TYLENOL) suppository 650 mg  650 mg Rectal Q6H PRN Olga Lu MD        polyethylene glycol (GLYCOLAX) packet 17 g  17 g Oral Daily PRN Olga Lu MD   17 g at 08/17/20 1456    promethazine (PHENERGAN) tablet 12.5 mg  12.5 mg Oral Q6H PRN Olga Lu MD   12.5 mg at 08/18/20 2144    Or    ondansetron (ZOFRAN) injection 4 mg  4 mg Intravenous Q6H PRN Olga Lu MD   4 mg at 08/18/20 1833    enoxaparin (LOVENOX) injection 40 mg  40 mg Subcutaneous Daily Olga Lu MD   40 mg at 08/18/20 0815    morphine (PF) injection 2 mg  2 mg Intravenous Q4H PRN Kianna Mcclellan MD   2 mg at 08/19/20 8055       Allergies:     Allergies   Allergen Reactions    Iodine Swelling     Throat swelling    Penicillins Rash       Problem List:    Patient Active Problem List   Diagnosis Code    Tobacco abuse Z72.0    Childhood asthma J45.909    Subclavian artery stenosis (HCC) I77.1    PUD (peptic ulcer disease) K27.9    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    Atrial tachycardia, multifocal (HCC) I47.1    Atrial flutter (HCC) I48.92    Essential hypertension I10    Chronic migraine w/o aura w/o status migrainosus, not intractable G43.709    Celiac artery stenosis (HCC) I77.4    Hypercholesterolemia E78.00    Epigastric abdominal pain R10.13    Hypokalemia E87.6    Constipation K59.00    Movement disorder G25.9    Chronic midline low back pain without sciatica M54.5, G89.29    Coronary artery disease involving native coronary artery of native heart without angina pectoris I25.10    Chest pain R07.9    Hyperthyroidism E05.90    Candida esophagitis (HCC) B37.81    Hiatal hernia K44.9    Pericardial effusion I31.3       Past Medical History:        Diagnosis Date    Candida esophagitis ([facility] [address] 01/22/2018    On EGD    Celiac artery stenosis (HCC)     Childhood asthma     Chronic midline low back pain without sciatica     S/P Laminectomy 2004 / 2008    Chronic migraine w/o aura w/o status migrainosus, not intractable     Coronary artery disease involving native coronary artery of native heart without angina pectoris 06/2014    S/P Angioplasty and Stent x 1 / Dr. Sky Quiles Duodenal stenosis 7-28-15    Dilated with balloon per Dr. Camron Mckeon hypertension     Hiatal hernia 01/22/2018    On EGD    Hypercholesterolemia     Hyperthyroidism 12/29/2017    Movement disorder     PUD (peptic ulcer disease)     Subclavian artery stenosis ([address]     Tobacco abuse        Past Surgical History:        Procedure Laterality Date    BACK SURGERY      x 2    CARDIAC SURGERY      COLONOSCOPY      HYSTERECTOMY  1994    Partial    LAPAROSCOPY  1992    LAPAROTOMY  1993    Lysis of Adhesion    LUMBAR LAMINECTOMY  2004 / 2008    With fusion in 2008   [facility] GASTROINTESTINAL ENDOSCOPY  6/1/15    with biopsies and brushings     UPPER GASTROINTESTINAL ENDOSCOPY  7/28/15    UPPER GASTROINTESTINAL ENDOSCOPY  4/25/16    gastric biopsy, duodenal stenosis dilation     VASCULAR SURGERY  01/2015    Subclavian Artery Stent    WISDOM TOOTH EXTRACTION         Social History:    Social History     Tobacco Use    Smoking status: Current Every Day Smoker     Packs/day: 0.50     Years: 10.00     Pack years: 5.00     Types: Cigarettes    Smokeless tobacco: Never Used   Substance Use Topics    Alcohol use:  No                                Ready to quit: Not Answered  Counseling given: Not Answered      Vital Signs (Current):   Vitals:    08/18/20 1530 08/18/20 1919 08/18/20 2354 08/19/20 0515   BP: (!) 141/89 (!) 147/80 136/85 (!) 156/81   Pulse: 93 78 76 86   Resp: 16 16 16 16   Temp: 98 °F (36.7 °C) 99.3 °F (37.4 °C) 98.3 °F (36.8 °C) 99 °F (37.2 °C)   TempSrc: Oral Oral Oral Oral   SpO2: 97% 97% 96% 96%   Weight:    120 lb 3.2 oz (54.5 kg)   Height:                                                  BP Readings from Last 3 Encounters:   08/19/20 (!) 156/81   08/14/20 (!) 141/92   02/04/18 (!) 143/58       NPO Status:                                                                                 BMI:   Wt Readings from Last 3 Encounters:   08/19/20 120 lb 3.2 oz (54.5 kg)   08/14/20 112 lb (50.8 kg)   12/29/17 116 lb 1.6 oz (52.7 kg)     Body mass index is 21.29 kg/m². CBC:   Lab Results   Component Value Date    WBC 6.2 08/18/2020    RBC 4.05 08/18/2020    HGB 13.8 08/18/2020    HCT 41.1 08/18/2020    .5 08/18/2020    RDW 15.7 08/18/2020     08/18/2020       CMP:   Lab Results   Component Value Date     08/18/2020    K 3.2 08/18/2020    CL 95 08/18/2020    CO2 24 08/18/2020    BUN 10 08/18/2020    CREATININE 0.8 08/18/2020    GFRAA >60 08/18/2020    GFRAA >60 12/11/2009    AGRATIO 1.4 08/14/2020    LABGLOM >60 08/18/2020    GLUCOSE 112 08/18/2020    PROT 8.0 08/18/2020    PROT 7.7 12/08/2009    CALCIUM 9.1 08/18/2020    BILITOT 0.6 08/18/2020    ALKPHOS 59 08/18/2020    AST 25 08/18/2020    ALT <5 08/18/2020       POC Tests: No results for input(s): POCGLU, POCNA, POCK, POCCL, POCBUN, POCHEMO, POCHCT in the last 72 hours. Coags:   Lab Results   Component Value Date    PROTIME 11.7 08/15/2020    PROTIME 12.4 12/08/2009    INR 1.01 08/15/2020    APTT 27.8 02/04/2018       HCG (If Applicable): No results found for: PREGTESTUR, PREGSERUM, HCG, HCGQUANT     ABGs: No results found for: PHART, PO2ART, CWG2KUS, DDY7ZWP, BEART, A2UPEKDE     Type & Screen (If Applicable):  No results found for: LABABO, LABRH    Drug/Infectious Status (If Applicable):  No results found for: HIV, HEPCAB    COVID-19 Screening (If Applicable): No results found for: COVID19      Anesthesia Evaluation  Patient summary reviewed   history of anesthetic complications: PONV. Airway: Mallampati: II  TM distance: >3 FB   Neck ROM: full  Mouth opening: > = 3 FB Dental:      Comment: Chipped molars all 4 quadrants.     Pulmonary:   (+) current smoker    (-) wheezes                          ROS comment: Patient does not use inhalers   Cardiovascular:  Exercise tolerance: good (>4 METS),   (+) hypertension:, CAD:, CABG/stent (stent):, dysrhythmias: atrial fibrillation and atrial flutter,         Rhythm: regular  Rate: normal                 ROS comment: 2019  CONCLUSIONS    SUMMARY:    1. Left ventricle: The cavity size was normal. There was mild     concentric hypertrophy. Systolic function was normal. The     estimated ejection fraction was in the range of 60% to 65%. Wall     motion was normal; there were no regional wall motion     abnormalities. Doppler parameters are consistent with abnormal     left ventricular relaxation (grade 1 diastolic dysfunction). 2. Mitral valve: The annulus was mildly calcified. 3. Right ventricle: The cavity size was normal. Wall thickness was     normal. Systolic function was normal.  4. Pulmonary arteries: Estimated PA peak pressure is 16mm Hg (S). 5. Trivial pericardial effusion with no evidence of tamponade. Impressions:  No prior study was available for comparison. 2019 NM stress -wnl     Neuro/Psych:   (+) headaches:,    (-) seizures, TIA and CVA           GI/Hepatic/Renal:   (+) hiatal hernia, PUD,          ROS comment: Abdominal pain  Pt denies n/v today. Endo/Other:    (+) hyperthyroidism, blood dyscrasia: anticoagulation therapy:., .                 Abdominal:           Vascular:           ROS comment: Subclavian artery stenosis. Anesthesia Plan      TIVA     ASA 3     (Patient verbalizes understanding that there is the possibility of recall with TIVA. Patient verbalizes her wishes to proceed with planned anesthetic.)  Induction: intravenous. Anesthetic plan and risks discussed with patient. Plan discussed with CRNA.                   Shital Gutierrez MD   8/19/2020

## 2020-08-22 NOTE — DISCHARGE SUMMARY
Hospital Medicine Discharge Summary    Patient ID: Ange Aponte      Patient's PCP: Jolene Choudhury DO    Admit Date: 8/15/2020     Discharge Date: 8/19/2020      Admitting Physician: Sudarshan Combs MD     Discharge Physician: STEPHANIE Trejo - CNP     Discharge Diagnoses: Active Hospital Problems    Diagnosis    Celiac artery stenosis (HCC) [I77.4]     Priority: High    Chest pain [R07.9]    PUD (peptic ulcer disease) [K27.9]       The patient was seen and examined on day of discharge and this discharge summary is in conjunction with any daily progress note from day of discharge. Hospital Course:     Patient is a 48 yo female with hx CAD/stent (2014), HTN, HLD, PVD, duodenal stenosis s/p dilation (2016), thyroid disease, tobacco use. She presented to ER initially 8/14 with abdominal pain, n/v, fall. CT abdomen and pelvis showed moderate pericardial effusion, no acute process of abdomen/pelvis. She was admitted for further workup but subsequently left AMA. She continued to experience pain so she returned to hospital.      Abd pain nausea and vomiting unclear etiology   - known hx of PUD and celiac artery stenosis. - CTA of abd suggested moderate stenosis of celiac artery. - Vascular surgery was consulted, scans reviewed pt \" no signs of mesenteric ischemia SMA and SARAH BETH patent \" no further testing needed at this laurent   - GI consult  - added reglan   - RUQ US - pending   - 8/19 EGD - Bx obtained can follow up with GI as OP      Constipation: lg stool burden seen on CT suspect contributing to clinical picture   - given supp no BM for 2 days per pt   - aggressive bowel reg  - 8/19 small BM      PUD: continue PPI      Pericardial effusion / CAD / chest pain. Patient with trace pericardial effusion on previous echo. Moderate pericardial effusion noted on CT scan. Discussed with cardiology, CT scan not reliable for measuring pericardial effusion.  No evidence of pericarditis, no indication for echo. Cardiology has signed off. Continue Plavix.      Back pain. CT showed advanced degenerative changes in lumbar spine but no acute fracture. Add lidocaine patch. May also use heat or ice to back.       Hypothyroidism. S/p thyroid radiation ablation 2018. Has not followed with endocrinology or levothyroxine. TSH 44, Free T4 0.2. Begin levothyroxine 50 mcg daily.      Urinary frequency. Patient afebrile. No leukocytosis. Check UA negative- suspect 2/2 to constipation      HTN: continue home meds      Tobacco use. Encouraged cessation. Nicotine patch.      Bradycardia: suspect 2/2 to BB will stop as pt says she does not take this am home. - restart ACE   Physical Exam Performed:     BP (!) 160/75   Pulse 82   Temp 97.5 °F (36.4 °C) (Temporal)   Resp 16   Ht 5' 3\" (1.6 m)   Wt 120 lb 3.2 oz (54.5 kg)   SpO2 93%   BMI 21.29 kg/m²       General appearance:  No apparent distress, appears stated age and cooperative. HEENT:  Normal cephalic, atraumatic without obvious deformity. Pupils equal, round, and reactive to light. Extra ocular muscles intact. Conjunctivae/corneas clear. Neck: Supple, with full range of motion. No jugular venous distention. Trachea midline. Respiratory:  Normal respiratory effort. Clear to auscultation, bilaterally without Rales/Wheezes/Rhonchi. Cardiovascular:  Regular rate and rhythm with normal S1/S2 without murmurs, rubs or gallops. Abdomen: Soft, non-tender, non-distended with normal bowel sounds. Musculoskeletal:  No clubbing, cyanosis or edema bilaterally. Full range of motion without deformity. Skin: Skin color, texture, turgor normal.  No rashes or lesions. Neurologic:  Neurovascularly intact without any focal sensory/motor deficits.  Cranial nerves: II-XII intact, grossly non-focal.  Psychiatric:  Alert and oriented, thought content appropriate, normal insight  Capillary Refill: Brisk,< 3 seconds   Peripheral Pulses: +2 palpable, equal bilaterally tablet,R-3Normal              Details   ondansetron (ZOFRAN ODT) 4 MG disintegrating tablet Take 1-2 tablets by mouth every 8 hours as needed for Nausea May substitute the non ODT tablets if not covered financially by the insurance plan., Disp-12 tablet, R-0Print      gabapentin (NEURONTIN) 600 MG tablet Historical Med      promethazine (PHENERGAN) 25 MG tablet Take 25 mg by mouth every 6 hours as needed for Nausea (Nausea due to migraines)Historical Med      clopidogrel (PLAVIX) 75 MG tablet Take 1 tablet by mouth daily, Disp-30 tablet, R-5Normal      SUMAtriptan (IMITREX) 100 MG tablet TAKE ONE BY MOUTH AT ONSET OF MIGRAINE. MAY REPEAT ONCE IN 2 HRS. MAX OF 2 PER DAY., Disp-9 tablet, R-5Normal             Time Spent on discharge is more than 30 minutes in the examination, evaluation, counseling and review of medications and discharge plan. Signed:    STEPHANIE Rojas CNP   8/22/2020      Thank you Lianne Gifford DO for the opportunity to be involved in this patient's care. If you have any questions or concerns please feel free to contact me at [phone].

## 2022-06-15 NOTE — ED NOTES
Jeanne Shook presents today for her OB visit. Patient would like communication of their results via: Angelia Kauffman    Patient's current [facility] status: Active.      Chaperone needed:  No Pt tripped at 0400 over a cord, pt now has intense back pain, numbness on bilateral arms and right second toe, and abdomen pain generalized pain 10/10, pt has +sensation on all four extremities, pt reports feeling cold, warm blankets applied, bed alarm placed on pt, call light given, side rails up x2, bed lock,      Keri Donohue, FLORIAN  08/14/20 8054

## (undated) DEVICE — BW-412T DISP COMBO CLEANING BRUSH: Brand: SINGLE USE COMBINATION CLEANING BRUSH

## (undated) DEVICE — PROCEDURE KIT ENDOSCP CUST

## (undated) DEVICE — SET VLV 3 PC AWS DISPOSABLE GRDIAN SCOPEVALET

## (undated) DEVICE — SOLUTION IV IRRIG WATER 500ML POUR BRL ST 2F7113

## (undated) DEVICE — FORCEPS BX L240CM WRK CHN 2.8MM STD CAP W/ NDL MIC MESH

## (undated) DEVICE — MOUTHPIECE ENDOSCP L CTRL OPN AND SIDE PORTS DISP

## (undated) DEVICE — 60 ML SYRINGE,REGULAR TIP: Brand: MONOJECT